# Patient Record
Sex: MALE | Race: BLACK OR AFRICAN AMERICAN | Employment: UNEMPLOYED | URBAN - METROPOLITAN AREA
[De-identification: names, ages, dates, MRNs, and addresses within clinical notes are randomized per-mention and may not be internally consistent; named-entity substitution may affect disease eponyms.]

---

## 2019-01-01 ENCOUNTER — ANESTHESIA EVENT (OUTPATIENT)
Dept: SURGERY | Age: 64
End: 2019-01-01
Payer: MEDICAID

## 2019-01-01 ENCOUNTER — HOSPITAL ENCOUNTER (OUTPATIENT)
Dept: NUCLEAR MEDICINE | Age: 64
Discharge: HOME | End: 2019-11-13
Attending: UROLOGY
Payer: MEDICAID

## 2019-01-01 ENCOUNTER — HOSPITAL ENCOUNTER (OUTPATIENT)
Age: 64
Setting detail: OUTPATIENT SURGERY
Discharge: HOME | End: 2019-09-19
Attending: UROLOGY | Admitting: UROLOGY
Payer: MEDICAID

## 2019-01-01 ENCOUNTER — APPOINTMENT (OUTPATIENT)
Dept: GENERAL RADIOLOGY | Age: 64
End: 2019-01-01
Attending: UROLOGY
Payer: MEDICAID

## 2019-01-01 ENCOUNTER — HOSPITAL ENCOUNTER (OUTPATIENT)
Dept: RADIATION THERAPY | Age: 64
Discharge: HOME | End: 2019-11-05

## 2019-01-01 ENCOUNTER — ANESTHESIA (OUTPATIENT)
Dept: SURGERY | Age: 64
End: 2019-01-01
Payer: MEDICAID

## 2019-01-01 ENCOUNTER — HOSPITAL ENCOUNTER (OUTPATIENT)
Dept: PREADMISSION TESTING | Age: 64
Discharge: HOME | End: 2019-09-16
Payer: MEDICAID

## 2019-01-01 ENCOUNTER — HOSPITAL ENCOUNTER (OUTPATIENT)
Dept: LAB | Age: 64
Discharge: HOME | End: 2019-09-16
Payer: MEDICAID

## 2019-01-01 VITALS
BODY MASS INDEX: 15.81 KG/M2 | OXYGEN SATURATION: 100 % | HEART RATE: 60 BPM | WEIGHT: 98.38 LBS | RESPIRATION RATE: 16 BRPM | TEMPERATURE: 97 F | SYSTOLIC BLOOD PRESSURE: 169 MMHG | DIASTOLIC BLOOD PRESSURE: 98 MMHG | HEIGHT: 66 IN

## 2019-01-01 DIAGNOSIS — Z01.818 PREOP TESTING: ICD-10-CM

## 2019-01-01 DIAGNOSIS — C79.51 PROSTATE CANCER METASTATIC TO BONE (HCC): Primary | ICD-10-CM

## 2019-01-01 DIAGNOSIS — C61 PROSTATE CANCER METASTATIC TO BONE (HCC): Primary | ICD-10-CM

## 2019-01-01 DIAGNOSIS — C61 PROSTATE CANCER (HCC): ICD-10-CM

## 2019-01-01 LAB
ANION GAP SERPL CALC-SCNC: 11 MMOL/L (ref 3–18)
ATRIAL RATE: 84 BPM
BACTERIA SPEC CULT: ABNORMAL
BUN SERPL-MCNC: 14 MG/DL (ref 7–18)
BUN/CREAT SERPL: 23 (ref 12–20)
CALCIUM SERPL-MCNC: 10.2 MG/DL (ref 8.5–10.1)
CALCULATED P AXIS, ECG09: 83 DEGREES
CALCULATED R AXIS, ECG10: -50 DEGREES
CALCULATED T AXIS, ECG11: -29 DEGREES
CHLORIDE SERPL-SCNC: 100 MMOL/L (ref 100–111)
CO2 SERPL-SCNC: 26 MMOL/L (ref 21–32)
CREAT SERPL-MCNC: 0.6 MG/DL (ref 0.6–1.3)
DIAGNOSIS, 93000: NORMAL
ERYTHROCYTE [DISTWIDTH] IN BLOOD BY AUTOMATED COUNT: 18.7 % (ref 11.6–14.5)
GLUCOSE SERPL-MCNC: 78 MG/DL (ref 74–99)
HCT VFR BLD AUTO: 31.7 % (ref 36–48)
HGB BLD-MCNC: 10.1 G/DL (ref 13–16)
MCH RBC QN AUTO: 26.6 PG (ref 24–34)
MCHC RBC AUTO-ENTMCNC: 31.9 G/DL (ref 31–37)
MCV RBC AUTO: 83.4 FL (ref 74–97)
P-R INTERVAL, ECG05: 158 MS
PLATELET # BLD AUTO: 300 K/UL (ref 135–420)
PMV BLD AUTO: 10.5 FL (ref 9.2–11.8)
POTASSIUM SERPL-SCNC: 4.6 MMOL/L (ref 3.5–5.5)
Q-T INTERVAL, ECG07: 386 MS
QRS DURATION, ECG06: 70 MS
QTC CALCULATION (BEZET), ECG08: 456 MS
RBC # BLD AUTO: 3.8 M/UL (ref 4.7–5.5)
SERVICE CMNT-IMP: ABNORMAL
SERVICE CMNT-IMP: ABNORMAL
SODIUM SERPL-SCNC: 137 MMOL/L (ref 136–145)
VENTRICULAR RATE, ECG03: 84 BPM
WBC # BLD AUTO: 6.6 K/UL (ref 4.6–13.2)

## 2019-01-01 PROCEDURE — C2625 STENT, NON-COR, TEM W/DEL SY: HCPCS | Performed by: UROLOGY

## 2019-01-01 PROCEDURE — 88305 TISSUE EXAM BY PATHOLOGIST: CPT

## 2019-01-01 PROCEDURE — 74011250636 HC RX REV CODE- 250/636: Performed by: NURSE ANESTHETIST, CERTIFIED REGISTERED

## 2019-01-01 PROCEDURE — 77030013604 HC ELECTRD CUT LP OCOA -F: Performed by: UROLOGY

## 2019-01-01 PROCEDURE — 77030008477 HC STYL SATN SLP COVD -A: Performed by: ANESTHESIOLOGY

## 2019-01-01 PROCEDURE — 77030034696 HC CATH URETH FOL 2W BARD -A: Performed by: UROLOGY

## 2019-01-01 PROCEDURE — 76010000160 HC OR TIME 0.5 TO 1 HR INTENSV-TIER 1: Performed by: UROLOGY

## 2019-01-01 PROCEDURE — 87077 CULTURE AEROBIC IDENTIFY: CPT

## 2019-01-01 PROCEDURE — 77030010545: Performed by: UROLOGY

## 2019-01-01 PROCEDURE — 77030013079 HC BLNKT BAIR HGGR 3M -A: Performed by: ANESTHESIOLOGY

## 2019-01-01 PROCEDURE — 74011250636 HC RX REV CODE- 250/636

## 2019-01-01 PROCEDURE — 77030008683 HC TU ET CUF COVD -A: Performed by: ANESTHESIOLOGY

## 2019-01-01 PROCEDURE — 74011000258 HC RX REV CODE- 258

## 2019-01-01 PROCEDURE — 77030025663: Performed by: UROLOGY

## 2019-01-01 PROCEDURE — 85027 COMPLETE CBC AUTOMATED: CPT

## 2019-01-01 PROCEDURE — 76210000026 HC REC RM PH II 1 TO 1.5 HR: Performed by: UROLOGY

## 2019-01-01 PROCEDURE — 74011250636 HC RX REV CODE- 250/636: Performed by: UROLOGY

## 2019-01-01 PROCEDURE — 87086 URINE CULTURE/COLONY COUNT: CPT

## 2019-01-01 PROCEDURE — 36415 COLL VENOUS BLD VENIPUNCTURE: CPT

## 2019-01-01 PROCEDURE — 74011636320 HC RX REV CODE- 636/320: Performed by: UROLOGY

## 2019-01-01 PROCEDURE — 74011000272 HC RX REV CODE- 272: Performed by: UROLOGY

## 2019-01-01 PROCEDURE — 76210000006 HC OR PH I REC 0.5 TO 1 HR: Performed by: UROLOGY

## 2019-01-01 PROCEDURE — 74011000258 HC RX REV CODE- 258: Performed by: UROLOGY

## 2019-01-01 PROCEDURE — 80048 BASIC METABOLIC PNL TOTAL CA: CPT

## 2019-01-01 PROCEDURE — 74011000250 HC RX REV CODE- 250

## 2019-01-01 PROCEDURE — 74420 UROGRAPHY RTRGR +-KUB: CPT

## 2019-01-01 PROCEDURE — 77030018846 HC SOL IRR STRL H20 ICUM -A: Performed by: UROLOGY

## 2019-01-01 PROCEDURE — 87186 SC STD MICRODIL/AGAR DIL: CPT

## 2019-01-01 PROCEDURE — 74011000250 HC RX REV CODE- 250: Performed by: UROLOGY

## 2019-01-01 PROCEDURE — 77030018836 HC SOL IRR NACL ICUM -A: Performed by: UROLOGY

## 2019-01-01 PROCEDURE — 93005 ELECTROCARDIOGRAM TRACING: CPT

## 2019-01-01 PROCEDURE — 77030012961 HC IRR KT CYSTO/TUR ICUM -A: Performed by: UROLOGY

## 2019-01-01 PROCEDURE — 77030020269 HC MISC IMPL: Performed by: UROLOGY

## 2019-01-01 PROCEDURE — C1769 GUIDE WIRE: HCPCS | Performed by: UROLOGY

## 2019-01-01 PROCEDURE — 77030040361 HC SLV COMPR DVT MDII -B: Performed by: UROLOGY

## 2019-01-01 PROCEDURE — 76060000032 HC ANESTHESIA 0.5 TO 1 HR: Performed by: UROLOGY

## 2019-01-01 PROCEDURE — C1758 CATHETER, URETERAL: HCPCS | Performed by: UROLOGY

## 2019-01-01 PROCEDURE — 74011250637 HC RX REV CODE- 250/637: Performed by: UROLOGY

## 2019-01-01 DEVICE — INLAY OPTIMA URETERAL STENT W/O GUIDEWIRE
Type: IMPLANTABLE DEVICE | Site: URETER | Status: FUNCTIONAL
Brand: BARD® INLAY OPTIMA® URETERAL STENT

## 2019-01-01 RX ORDER — SODIUM CHLORIDE 0.9 % (FLUSH) 0.9 %
5-40 SYRINGE (ML) INJECTION EVERY 8 HOURS
Status: DISCONTINUED | OUTPATIENT
Start: 2019-01-01 | End: 2019-01-01 | Stop reason: HOSPADM

## 2019-01-01 RX ORDER — ONDANSETRON 2 MG/ML
4 INJECTION INTRAMUSCULAR; INTRAVENOUS ONCE
Status: CANCELLED | OUTPATIENT
Start: 2019-01-01 | End: 2019-01-01

## 2019-01-01 RX ORDER — SUCCINYLCHOLINE CHLORIDE 100 MG/5ML
SYRINGE (ML) INTRAVENOUS AS NEEDED
Status: DISCONTINUED | OUTPATIENT
Start: 2019-01-01 | End: 2019-01-01 | Stop reason: HOSPADM

## 2019-01-01 RX ORDER — FAMOTIDINE 20 MG/1
20 TABLET, FILM COATED ORAL ONCE
Status: DISCONTINUED | OUTPATIENT
Start: 2019-01-01 | End: 2019-01-01

## 2019-01-01 RX ORDER — OXYCODONE AND ACETAMINOPHEN 7.5; 325 MG/1; MG/1
1 TABLET ORAL
Qty: 30 TAB | Refills: 0 | Status: SHIPPED | OUTPATIENT
Start: 2019-01-01 | End: 2019-01-01

## 2019-01-01 RX ORDER — HYDROMORPHONE HYDROCHLORIDE 2 MG/ML
0.5 INJECTION, SOLUTION INTRAMUSCULAR; INTRAVENOUS; SUBCUTANEOUS
Status: CANCELLED | OUTPATIENT
Start: 2019-01-01

## 2019-01-01 RX ORDER — SODIUM CHLORIDE, SODIUM LACTATE, POTASSIUM CHLORIDE, CALCIUM CHLORIDE 600; 310; 30; 20 MG/100ML; MG/100ML; MG/100ML; MG/100ML
50 INJECTION, SOLUTION INTRAVENOUS CONTINUOUS
Status: DISCONTINUED | OUTPATIENT
Start: 2019-01-01 | End: 2019-01-01 | Stop reason: HOSPADM

## 2019-01-01 RX ORDER — FENTANYL CITRATE 50 UG/ML
50 INJECTION, SOLUTION INTRAMUSCULAR; INTRAVENOUS AS NEEDED
Status: CANCELLED | OUTPATIENT
Start: 2019-01-01

## 2019-01-01 RX ORDER — FAMOTIDINE 20 MG/1
20 TABLET, FILM COATED ORAL ONCE
Status: COMPLETED | OUTPATIENT
Start: 2019-01-01 | End: 2019-01-01

## 2019-01-01 RX ORDER — MAGNESIUM SULFATE 100 %
4 CRYSTALS MISCELLANEOUS AS NEEDED
Status: DISCONTINUED | OUTPATIENT
Start: 2019-01-01 | End: 2019-01-01 | Stop reason: HOSPADM

## 2019-01-01 RX ORDER — FENTANYL CITRATE 50 UG/ML
INJECTION, SOLUTION INTRAMUSCULAR; INTRAVENOUS AS NEEDED
Status: DISCONTINUED | OUTPATIENT
Start: 2019-01-01 | End: 2019-01-01 | Stop reason: HOSPADM

## 2019-01-01 RX ORDER — LIDOCAINE HYDROCHLORIDE 20 MG/ML
INJECTION, SOLUTION EPIDURAL; INFILTRATION; INTRACAUDAL; PERINEURAL AS NEEDED
Status: DISCONTINUED | OUTPATIENT
Start: 2019-01-01 | End: 2019-01-01 | Stop reason: HOSPADM

## 2019-01-01 RX ORDER — DEXAMETHASONE SODIUM PHOSPHATE 4 MG/ML
INJECTION, SOLUTION INTRA-ARTICULAR; INTRALESIONAL; INTRAMUSCULAR; INTRAVENOUS; SOFT TISSUE AS NEEDED
Status: DISCONTINUED | OUTPATIENT
Start: 2019-01-01 | End: 2019-01-01 | Stop reason: HOSPADM

## 2019-01-01 RX ORDER — SODIUM CHLORIDE 0.9 % (FLUSH) 0.9 %
5-40 SYRINGE (ML) INJECTION AS NEEDED
Status: DISCONTINUED | OUTPATIENT
Start: 2019-01-01 | End: 2019-01-01 | Stop reason: HOSPADM

## 2019-01-01 RX ORDER — PROPOFOL 10 MG/ML
INJECTION, EMULSION INTRAVENOUS AS NEEDED
Status: DISCONTINUED | OUTPATIENT
Start: 2019-01-01 | End: 2019-01-01 | Stop reason: HOSPADM

## 2019-01-01 RX ORDER — OXYBUTYNIN CHLORIDE 5 MG/1
5 TABLET ORAL
Qty: 10 TAB | Refills: 0 | Status: SHIPPED | OUTPATIENT
Start: 2019-01-01 | End: 2020-01-01

## 2019-01-01 RX ORDER — MIDAZOLAM HYDROCHLORIDE 1 MG/ML
INJECTION, SOLUTION INTRAMUSCULAR; INTRAVENOUS AS NEEDED
Status: DISCONTINUED | OUTPATIENT
Start: 2019-01-01 | End: 2019-01-01 | Stop reason: HOSPADM

## 2019-01-01 RX ORDER — HYDROMORPHONE HYDROCHLORIDE 2 MG/ML
0.5 INJECTION, SOLUTION INTRAMUSCULAR; INTRAVENOUS; SUBCUTANEOUS
Status: DISCONTINUED | OUTPATIENT
Start: 2019-01-01 | End: 2019-01-01 | Stop reason: HOSPADM

## 2019-01-01 RX ORDER — ONDANSETRON HYDROCHLORIDE 4 MG/2ML
INJECTION, SOLUTION INTRAMUSCULAR; INTRAVENOUS AS NEEDED
Status: DISCONTINUED | OUTPATIENT
Start: 2019-01-01 | End: 2019-01-01 | Stop reason: HOSPADM

## 2019-01-01 RX ORDER — SODIUM CHLORIDE 0.9 % (FLUSH) 0.9 %
5-40 SYRINGE (ML) INJECTION EVERY 8 HOURS
Status: CANCELLED | OUTPATIENT
Start: 2019-01-01

## 2019-01-01 RX ORDER — MAGNESIUM SULFATE 100 %
4 CRYSTALS MISCELLANEOUS AS NEEDED
Status: CANCELLED | OUTPATIENT
Start: 2019-01-01

## 2019-01-01 RX ORDER — DIPHENHYDRAMINE HYDROCHLORIDE 50 MG/ML
INJECTION, SOLUTION INTRAMUSCULAR; INTRAVENOUS AS NEEDED
Status: DISCONTINUED | OUTPATIENT
Start: 2019-01-01 | End: 2019-01-01 | Stop reason: HOSPADM

## 2019-01-01 RX ORDER — FENTANYL CITRATE 50 UG/ML
50 INJECTION, SOLUTION INTRAMUSCULAR; INTRAVENOUS AS NEEDED
Status: DISCONTINUED | OUTPATIENT
Start: 2019-01-01 | End: 2019-01-01 | Stop reason: HOSPADM

## 2019-01-01 RX ORDER — SODIUM CHLORIDE 0.9 % (FLUSH) 0.9 %
5-40 SYRINGE (ML) INJECTION AS NEEDED
Status: CANCELLED | OUTPATIENT
Start: 2019-01-01

## 2019-01-01 RX ORDER — ONDANSETRON 2 MG/ML
4 INJECTION INTRAMUSCULAR; INTRAVENOUS ONCE
Status: DISCONTINUED | OUTPATIENT
Start: 2019-01-01 | End: 2019-01-01 | Stop reason: HOSPADM

## 2019-01-01 RX ORDER — DIPHENHYDRAMINE HYDROCHLORIDE 50 MG/ML
12.5 INJECTION, SOLUTION INTRAMUSCULAR; INTRAVENOUS ONCE
Status: DISCONTINUED | OUTPATIENT
Start: 2019-01-01 | End: 2019-01-01 | Stop reason: HOSPADM

## 2019-01-01 RX ADMIN — GENTAMICIN SULFATE 240 MG: 40 INJECTION, SOLUTION INTRAMUSCULAR; INTRAVENOUS at 11:21

## 2019-01-01 RX ADMIN — MIDAZOLAM HYDROCHLORIDE 1 MG: 1 INJECTION, SOLUTION INTRAMUSCULAR; INTRAVENOUS at 10:08

## 2019-01-01 RX ADMIN — SODIUM CHLORIDE, SODIUM LACTATE, POTASSIUM CHLORIDE, AND CALCIUM CHLORIDE 50 ML/HR: 600; 310; 30; 20 INJECTION, SOLUTION INTRAVENOUS at 08:42

## 2019-01-01 RX ADMIN — PROPOFOL 100 MG: 10 INJECTION, EMULSION INTRAVENOUS at 10:21

## 2019-01-01 RX ADMIN — FAMOTIDINE 20 MG: 20 TABLET ORAL at 08:44

## 2019-01-01 RX ADMIN — DEXAMETHASONE SODIUM PHOSPHATE 4 MG: 4 INJECTION, SOLUTION INTRA-ARTICULAR; INTRALESIONAL; INTRAMUSCULAR; INTRAVENOUS; SOFT TISSUE at 10:39

## 2019-01-01 RX ADMIN — PROPOFOL 50 MG: 10 INJECTION, EMULSION INTRAVENOUS at 10:30

## 2019-01-01 RX ADMIN — Medication 100 MG: at 10:21

## 2019-01-01 RX ADMIN — CEFTRIAXONE SODIUM 2 G: 2 INJECTION, POWDER, FOR SOLUTION INTRAMUSCULAR; INTRAVENOUS at 10:23

## 2019-01-01 RX ADMIN — FENTANYL CITRATE 50 MCG: 50 INJECTION, SOLUTION INTRAMUSCULAR; INTRAVENOUS at 10:21

## 2019-01-01 RX ADMIN — DIPHENHYDRAMINE HYDROCHLORIDE 25 MG: 50 INJECTION, SOLUTION INTRAMUSCULAR; INTRAVENOUS at 10:29

## 2019-01-01 RX ADMIN — FENTANYL CITRATE 25 MCG: 50 INJECTION, SOLUTION INTRAMUSCULAR; INTRAVENOUS at 10:31

## 2019-01-01 RX ADMIN — ONDANSETRON HYDROCHLORIDE 4 MG: 4 INJECTION, SOLUTION INTRAMUSCULAR; INTRAVENOUS at 10:52

## 2019-01-01 RX ADMIN — LIDOCAINE HYDROCHLORIDE 40 MG: 20 INJECTION, SOLUTION EPIDURAL; INFILTRATION; INTRACAUDAL; PERINEURAL at 10:21

## 2019-08-29 PROBLEM — F17.200 NICOTINE DEPENDENCE: Status: ACTIVE | Noted: 2019-01-01

## 2019-08-29 PROBLEM — K08.89 TOOTH PAIN: Status: ACTIVE | Noted: 2019-01-01

## 2019-08-29 PROBLEM — Z71.89 COUNSELING ON SUBSTANCE USE AND ABUSE: Status: ACTIVE | Noted: 2019-01-01

## 2019-08-29 PROBLEM — M65.4 DE QUERVAIN'S TENOSYNOVITIS: Status: ACTIVE | Noted: 2019-01-01

## 2019-08-29 PROBLEM — I10 ESSENTIAL HYPERTENSION: Status: ACTIVE | Noted: 2019-01-01

## 2019-08-29 PROBLEM — M25.539 PAIN IN WRIST JOINT: Status: ACTIVE | Noted: 2019-01-01

## 2019-08-29 PROBLEM — C61 PROSTATE CANCER METASTATIC TO BONE (HCC): Status: ACTIVE | Noted: 2019-01-01

## 2019-08-29 PROBLEM — N13.9 OBSTRUCTIVE UROPATHY: Status: ACTIVE | Noted: 2019-01-01

## 2019-08-29 PROBLEM — F32.A DEPRESSION: Status: ACTIVE | Noted: 2019-01-01

## 2019-08-29 PROBLEM — G47.00 INSOMNIA: Status: ACTIVE | Noted: 2019-01-01

## 2019-08-29 PROBLEM — R74.8 ELEVATED ALKALINE PHOSPHATASE LEVEL: Status: ACTIVE | Noted: 2019-01-01

## 2019-08-29 PROBLEM — R32 URINARY INCONTINENCE: Status: ACTIVE | Noted: 2019-01-01

## 2019-08-29 PROBLEM — Z23 IMMUNIZATION DUE: Status: ACTIVE | Noted: 2019-01-01

## 2019-08-29 PROBLEM — H53.8 BLURRY VISION: Status: ACTIVE | Noted: 2019-01-01

## 2019-08-29 PROBLEM — K05.30 PERIODONTITIS: Status: ACTIVE | Noted: 2019-01-01

## 2019-08-29 PROBLEM — D64.9 CHRONIC ANEMIA: Status: ACTIVE | Noted: 2019-01-01

## 2019-08-29 PROBLEM — Z20.2 EXPOSURE TO STD: Status: ACTIVE | Noted: 2019-01-01

## 2019-08-29 PROBLEM — N13.5 OBSTRUCTION OF RIGHT URETER: Status: ACTIVE | Noted: 2019-01-01

## 2019-08-29 PROBLEM — G57.01 PIRIFORMIS SYNDROME OF RIGHT SIDE: Status: ACTIVE | Noted: 2019-01-01

## 2019-08-29 PROBLEM — L29.9 PRURITUS: Status: ACTIVE | Noted: 2019-01-01

## 2019-08-29 PROBLEM — F41.9 ANXIETY AND DEPRESSION: Status: ACTIVE | Noted: 2019-01-01

## 2019-08-29 PROBLEM — Z87.891 HISTORY OF SMOKING 25-50 PACK YEARS: Status: ACTIVE | Noted: 2019-01-01

## 2019-08-29 PROBLEM — M25.579 ANKLE JOINT PAIN: Status: ACTIVE | Noted: 2019-01-01

## 2019-08-29 PROBLEM — N52.9 ERECTILE DYSFUNCTION: Status: ACTIVE | Noted: 2019-01-01

## 2019-08-29 PROBLEM — R19.5 HEME POSITIVE STOOL: Status: ACTIVE | Noted: 2019-01-01

## 2019-08-29 PROBLEM — N40.0 ENLARGED PROSTATE: Status: ACTIVE | Noted: 2019-01-01

## 2019-08-29 PROBLEM — F10.10 NONDEPENDENT ALCOHOL ABUSE, CONTINUOUS DRINKING BEHAVIOR: Status: ACTIVE | Noted: 2019-01-01

## 2019-08-29 PROBLEM — M25.551 RIGHT HIP PAIN: Status: ACTIVE | Noted: 2019-01-01

## 2019-08-29 PROBLEM — F32.A ANXIETY AND DEPRESSION: Status: ACTIVE | Noted: 2019-01-01

## 2019-08-29 PROBLEM — M79.673 FOOT PAIN: Status: ACTIVE | Noted: 2019-01-01

## 2019-08-29 PROBLEM — C79.51 PROSTATE CANCER METASTATIC TO BONE (HCC): Status: ACTIVE | Noted: 2019-01-01

## 2019-08-29 NOTE — H&P (VIEW-ONLY)
Charito Asp  1955 
63 y.o. 
2019 PROSTATE CANCER ESTABLISHED PATIENT Encounter Diagnoses ICD-10-CM ICD-9-CM 1. Prostate cancer ([facility] [address] C61 185 2. Hematuria, unspecified type R31.9 599.70 Assessment: 1. 66 y.o. male with [id number] GS 5+4 (Diagnosed 5/10/19), Prostate cancer in 4/4 cores, prostate volume of 34 cm3, Pre-biopsy PSA= 79.050 ng/mL. CT A/P W Cont 19 - Widespread bony metastatic disease. ADT initiated with Degarelix 240mg on 6/3/19. Transitioned to Eligard 45mg on 19. Started on [place] 2019 Most recent PSA measured 195 ng/mL on 19 
 
2. Obstructive uropathy 2/ prostatic mass w/moderate right hydroureteronephrosis per CT A/P W Cont on  Right PCNU tube placed 2019.  
 
3. Urinary retention, catheter placed 3/25/19. VT failed 5/10/2019. Vargas catheter removed 2019. 
 
4. Gross hematuria s/p right PCNU tube placement on 19 S/p right renal angiogram on 19 without evidence of bleeding or vascular abnormality 5. Vit D deficiency, currently on ergocalciferol 6. HTN Plan: · PSA, T drawn today, will inform · Urine sent for pre-op culture, will treat accordingly · Continue ADT with Eligard 45mg, most recent injection on 19 · Continue Vit D and Ca supplements to support bone health during ADT. Continue ergocalciferol · Continue [place] · Dental clearance cost prohibitive, unable to start [facility] · Referred to pain management at [facility] to discuss chronic pain management. Refill for percocet provided today but advised that all future RXs will need to come from pain management · Plan for PCNU tube removal, right ureteral stent placement, and a possible channel TURP. Reviewed R/B. Surgery letter sent · Explained ureteral stent will need to be exchanged q6 months · To OR for PCNU tube removal, right ureteral stent placement, and a possible channel TURP 
 
 *Please call [phone] Sean Reynolds, patient's cousin) to schedule surgery* Chief Complaint:  
Chief Complaint Patient presents with  Prostate Cancer  Urinary Retention HPI: Vin Brooks is a 66 y.o. male who presents in follow up for recently diagnosed prostate cancer and urinary retention. Patient is s/p prostate biopsy on 5/10/19 for elevated PSA of 79.050 ng/mL, which revealed GS 5+4, prostate cancer in 4/4 cores, TRUS volume of 34 cm3. Patient is also followed for urinary retention after a zelaya catheter was placed on 3/25/19 in the ER. Patient had a VT at the time of the biopsy that was unsuccessful. Zelaya removed 6/2019. Patient had a CT A/P on 5/31/19 that revealed diffuse bony metastatic disease as well as obstructing uropathy and associated hydroureteronephrosis on the right 2/2 a prostatic mass. A bone scan was not performed. Patient is s/p a right PCNU tube placement on 6/2/19. Since the PCNU tube was placed, patient was c/o gross hematuria. He then had a right renal angiogram on 6/7/19 to further evaluate that was negative for any active bleeding. Patient no longer has a zelaya catheter. Was started on ADT in the hospital with Degarelix 240mg on 6/3/2019. Transitioned to Eligard 45mg injection on 7/18/19. Patient presents today with his cousin. He notes ongoing trouble managing his pain which he primarily localizes around his PCNU tube. He has been taking percocet with only temporary relief. He remains unable to void. His cousin also indicated that the patient tried to get a dental clearance because it was cost prohibitive to see the dentist. She also notes patient has been difficulty walking 2/2 his pain. AUA SS N/A 
MEGAN 3 on 5/23/2019 PCa Dx DATE: 5/10/19 PSA Trend (ng/ml): PSA /TESTOSTERONE - BSHSI PSA Latest Ref Rng & Units 0.00 - 4.100 ng/ml 7/18/2019 195  
2/27/2019 79.050 (A) Past Medical History Past Medical History: Diagnosis Date  Elevated PSA  Hypertension  Prostate cancer metastatic to bone ([facility] [address]  Psychiatric disorder  Sleep disorder Past Surgical History Past Surgical History:  
Procedure Laterality Date  HX UROLOGICAL  05/10/2019 Pnbx-Trus vol 34 cm3. Path: [place] 5+4 in L Weaver, [place] 4+5 in L Base, R Base, R Weaver. Dr. Amada Agustin. Family History History reviewed. No pertinent family history. Social History Social History Socioeconomic History  Marital status: SINGLE Spouse name: Not on file  Number of children: Not on file  Years of education: Not on file  Highest education level: Not on file Occupational History  Not on file Social Needs  Financial resource strain: Not on file  Food insecurity:  
  Worry: Not on file Inability: Not on file  Transportation needs:  
  Medical: Not on file Non-medical: Not on file Tobacco Use  Smoking status: Current Every Day Smoker Packs/day: 1.00 Years: 45.00 Pack years: 45.00  Smokeless tobacco: Former User Substance and Sexual Activity  Alcohol use: Yes  Drug use: Not on file  Sexual activity: Not on file Lifestyle  Physical activity:  
  Days per week: Not on file Minutes per session: Not on file  Stress: Not on file Relationships  Social connections:  
  Talks on phone: Not on file Gets together: Not on file Attends Anabaptist service: Not on file Active member of club or organization: Not on file Attends meetings of clubs or organizations: Not on file Relationship status: Not on file  Intimate partner violence:  
  Fear of current or ex partner: Not on file Emotionally abused: Not on file Physically abused: Not on file Forced sexual activity: Not on file Other Topics Concern  Not on file Social History Narrative  Not on file Allergies Allergen Reactions  Pcn [Penicillins] Hives Current Outpatient Medications Medication Sig  
 oxyCODONE-acetaminophen (PERCOCET) 5-325 mg per tablet Take 1 Tab by mouth every four (4) hours as needed for Pain for up to 30 days. Max Daily Amount: 6 Tabs.  enzalutamide (XTANDI) 40 mg capsule Take four capsules by mouth daily at bedtime  ergocalciferol (ERGOCALCIFEROL) 50,000 unit capsule Take 50,000 Units by mouth.  VITAMIN B-1 100 mg tablet TAKE 1 TABLET BY MOUTH ONCE DAILY  thiamine HCL (B-1) 100 mg tablet Take 100 mg by mouth.  senna-docusate (PERICOLACE) 8.6-50 mg per tablet Take 1 Tab by mouth.  lidocaine 4 % patch 1 Patch by TransDERmal route.  naproxen (NAPROSYN) 500 mg tablet Take 500 mg by mouth.  mirtazapine (REMERON) 15 mg tablet  amLODIPine (NORVASC) 5 mg tablet Take  by mouth.  tamsulosin (FLOMAX) 0.4 mg capsule Take  by mouth.  hydroCHLOROthiazide (HYDRODIURIL) 25 mg tablet Take  by mouth. No current facility-administered medications for this visit. Review of Systems Constitutional: Fever: No 
Skin: Rash: No 
HEENT: Hearing difficulty: No 
Eyes: Blurred vision: No 
Cardiovascular: Chest pain: No 
Respiratory: Shortness of breath: No 
Gastrointestinal: Nausea/vomiting: No 
Musculoskeletal: Back pain: Yes 
back pain Neurological: Weakness: No 
Psychological: Memory loss: No 
Comments/additional findings:  
 
 
Physical Exam: 
Visit Vitals Ht 5' 6\" (1.676 m) Wt 99 lb 6.4 oz (45.1 kg) BMI 16.04 kg/m² Constitutional: WDWN,No acute distress. HEENT: EOMs in tact CV:  No peripheral swelling noted Respiratory: No respiratory distress or difficulties : Right PCNU to leg bag, urine very dark and cloudy Skin: No jaundice. Neuro/Psych:  Alert and oriented x 3. Affect appropriate. EXT: no clubbing or cyanosis Labs reviewed today:  
Results for orders placed or performed in visit on 07/18/19 TESTOSTERONE, TOTAL, ADULT MALE Result Value Ref Range Testosterone 15 (L) 264 - 916 ng/dL PSA, DIAGNOSTIC (PROSTATE SPECIFIC AG) Result Value Ref Range Prostate Specific Ag 195.0 (H) 0.0 - 4.0 ng/mL AMB POC PVR, JEAN PIERRE,POST-VOID RES,US,NON-IMAGING Result Value Ref Range PVR 0 cc  
 
 
 
Sandra Hodge MD 
, Dept of Urology [facility] [place], [place] Pager #: [phone] CC: Syed Arroyo MD 
 
Medical documentation entered into the chart by Carol Adams medical scribe for Jerod Peguero MD on 8/29/2019.

## 2019-09-16 NOTE — PERIOP NOTES
PAT - SURGICAL PRE-ADMISSION INSTRUCTIONS    NAME:  Esthela Kemp                                                          TODAY'S DATE:  9/16/2019    SURGERY DATE:  9/19/2019                                  SURGERY ARRIVAL TIME:   0800    1. Do NOT eat or drink anything, including candy or gum, after MIDNIGHT on 09/18 , unless you have specific instructions from your Surgeon or Anesthesia Provider to do so. 2. No smoking on the day of surgery. 3. No alcohol 24 hours prior to the day of surgery. 4. No recreational drugs for one week prior to the day of surgery. 5. Leave all valuables, including money/purse, at home. 6. Remove all jewelry, nail polish, makeup (including mascara); no lotions, powders, deodorant, or perfume/cologne/after shave. 7. Glasses/Contact lenses and Dentures may be worn to the hospital.  They will be removed prior to surgery. 8. Call your doctor if symptoms of a cold or illness develop within 24 ours prior to surgery. 9. AN ADULT MUST DRIVE YOU HOME AFTER OUTPATIENT SURGERY. 10. If you are having an OUTPATIENT procedure, please make arrangements for a responsible adult to be with you for 24 hours after your surgery. 11. If you are admitted to the hospital, you will be assigned to a bed after surgery is complete. Normally a family member will not be able to see you until you are in your assigned bed. 15. Family is encouraged to accompany you to the hospital.  We ask visitors in the treatment area to be limited to ONE person at a time to ensure patient privacy. EXCEPTIONS WILL BE MADE AS NEEDED. 15. Children under 12 are discouraged from entering the treatment area and need to be supervised by an adult when in the waiting room. Special Instructions:    NONE. Patient Prep:    use skin prep cloths with CHG    These surgical instructions were reviewed with Anne-Marie Arrington during the PAT visit. A printed copy of the instructions was provided to Anne-Marie Arrington. Directions:   On the morning of surgery, please go to the [facility]. Enter the building from the [facility] entrance, 1st floor (next to the Emergency Room entrance). Take the elevator to the 2nd floor. Sign in at the Registration Desk.     If you have any questions and/or concerns, please do not hesitate to call:  (Prior to the day of surgery)  [facility] unit:  [phone]  (Day of surgery)  [facility] unit:  [phone]

## 2019-09-16 NOTE — PERIOP NOTES
Pt.has a PCN catheter right side. Needs urine sample. Stopcock turned off to patient. When turned after 10-15 minutes to obtain specimen stopcock leaking . Crack noted on side lumen. Disconnected stopcock and acquried specimen and replaced with new stopcock. Tubing gently flushed with normal saline. New guaze dressing and tape to PCN site. Told patient to call home health nurse to place proper dressing.

## 2019-09-19 NOTE — PERIOP NOTES
Pre-Op Summary    Pt arrived via car with family/friend and is oriented to time, place, person and situation. Patient with unsteady gait with cane assistive devices. Visit Vitals  BP (!) 166/94 (BP 1 Location: Left arm)   Pulse 81   Temp 97.7 °F (36.5 °C)   Resp 18   Ht 5' 6\" (1.676 m)   Wt 44.6 kg (98 lb 6 oz)   SpO2 100%   BMI 15.88 kg/m²       Peripheral IV located on Right hand . Patients belongings are located locked in store room. Patient's point of contact is Yee Johns, cousin and sister Mohit Foster and their contact number is: [phone] [facility], They will be in the waiting room. They are able to receive medication information. They will be their ride home.

## 2019-09-19 NOTE — ANESTHESIA PREPROCEDURE EVALUATION
Relevant Problems No relevant active problems Anesthetic History No history of anesthetic complications Review of Systems / Medical History Patient summary reviewed, nursing notes reviewed and pertinent labs reviewed Pulmonary Within defined limits Neuro/Psych Psychiatric history Cardiovascular Hypertension GI/Hepatic/Renal 
Within defined limits Endo/Other Within defined limits Other Findings Physical Exam 
 
Airway Mallampati: III 
TM Distance: 4 - 6 cm Neck ROM: normal range of motion Mouth opening: Normal 
 
 Cardiovascular Regular rate and rhythm,  S1 and S2 normal,  no murmur, click, rub, or gallop Dental 
 
Dentition: Poor dentition Pulmonary Breath sounds clear to auscultation Abdominal 
GI exam deferred Other Findings Anesthetic Plan ASA: 2 Anesthesia type: general 
 
 
 
 
Induction: Intravenous Anesthetic plan and risks discussed with: Patient

## 2019-09-19 NOTE — BRIEF OP NOTE
BRIEF OPERATIVE NOTE    Date of Procedure: 9/19/2019   Preoperative Diagnosis: prostate cancer c61  Postoperative Diagnosis: prostate cancer c61    Procedure(s):  CYSTOSCOPY TRANSURETHRAL RESECTION OF PROSTATE, Right ureteral stent placement; Right retrograde pyelogram; Right nephrostomy tube drain removal  Surgeon(s) and Role:     Aron Mcrae MD - Primary         Surgical Assistant: none    Surgical Staff:  Circ-1: Ashley Ayala RN  Circ-2: Lynnette Blanc RN  Radiology Technician: Lit Ledezma  Scrub Tech-1: Jacques MELENDEZ  Event Time In Time Out   Incision Start 10:32 AM    Incision Close 11:03 AM      Anesthesia: General   Estimated Blood Loss: minimal  Specimens:   ID Type Source Tests Collected by Time Destination   1 : prostate chips Preservative Prostate  Dexter Ruiz MD 9/19/2019 10:44 AM Pathology   1 : bladder urine culture Urine Straight Cath CULTURE, URINE Dexter Ruiz MD 9/19/2019 10:32 AM Microbiology      Findings: mild right hydro with some delayed drainage. No prostate cancer seen invading bladder. Mild lateral lobar hyperplasia of the prostate. Complications: none  Implants:   Implant Name Type Inv.  Item Serial No.  Lot No. LRB No. Used Action   Inlay optima ureteral stent   N/A BARD [id number] Right 1 Implanted       Dano Cunha MD  , Dept of Urology  [facility]  [facility]  Pager #: [phone]

## 2019-09-19 NOTE — ANESTHESIA POSTPROCEDURE EVALUATION
Procedure(s): 
CYSTOSCOPY TRANSURETHRAL RESECTION OF PROSTATE, Right ureteral stent placement; Right retrograde pyelogram; Right nephrostomy tube drain removal. 
 
general 
 
Anesthesia Post Evaluation Multimodal analgesia: multimodal analgesia used between 6 hours prior to anesthesia start to PACU discharge Patient location during evaluation: bedside Patient participation: complete - patient participated Level of consciousness: awake Pain management: adequate Airway patency: patent Anesthetic complications: no 
Cardiovascular status: stable Respiratory status: acceptable Hydration status: acceptable Post anesthesia nausea and vomiting:  controlled Vitals Value Taken Time /76 9/19/2019 11:41 AM  
Temp 36.1 °C (97 °F) 9/19/2019 11:11 AM  
Pulse 66 9/19/2019 11:42 AM  
Resp 15 9/19/2019 11:42 AM  
SpO2 95 % 9/19/2019 11:43 AM  
Vitals shown include unvalidated device data.

## 2019-09-19 NOTE — INTERVAL H&P NOTE
H&P Update:  Vin Brooks was seen and examined. History and physical has been reviewed. The patient has been examined. There have been no significant clinical changes since the completion of the originally dated History and Physical.  Patient identified by surgeon; surgical site was confirmed by patient and surgeon.

## 2019-09-19 NOTE — DISCHARGE INSTRUCTIONS
Patient Education   Patient Education     DISCHARGE SUMMARY from Nurse    PATIENT INSTRUCTIONS:    After general anesthesia or intravenous sedation, for 24 hours or while taking prescription Narcotics:  · Limit your activities  · Do not drive and operate hazardous machinery  · Do not make important personal or business decisions  · Do  not drink alcoholic beverages  · If you have not urinated within 8 hours after discharge, please contact your surgeon on call. Report the following to your surgeon:  · Excessive pain, swelling, redness or odor of or around the surgical area  · Temperature over 100.5  · Nausea and vomiting lasting longer than 4 hours or if unable to take medications  · Any signs of decreased circulation or nerve impairment to extremity: change in color, persistent  numbness, tingling, coldness or increase pain  · Any questions    What to do at Home:  Recommended activity: Activity as tolerated and no driving for today,       No smoking/ No tobacco products/ Avoid exposure to second hand smoke  Surgeon General's Warning:  Quitting smoking now greatly reduces serious risk to your health. Obesity, smoking, and sedentary lifestyle greatly increases your risk for illness    A healthy diet, regular physical exercise & weight monitoring are important for maintaining a healthy lifestyle    You may be retaining fluid if you have a history of heart failure or if you experience any of the following symptoms:  Weight gain of 3 pounds or more overnight or 5 pounds in a week, increased swelling in our hands or feet or shortness of breath while lying flat in bed. Please call your doctor as soon as you notice any of these symptoms; do not wait until your next office visit. The discharge information has been reviewed with the patient. The patient verbalized understanding.   Discharge medications reviewed with the patient and appropriate educational materials and side effects teaching were provided. .Patient armband removed and shredded  ___________________________________________________________________________________________________________________________________     Ureteral Stent Placement: What to Expect at Home  Your Recovery    A ureteral (say \"you-REE-ter-ul\") stent is a thin, hollow tube that is placed in the ureter to help urine pass from the kidney into the bladder. Ureters are the tubes that connect the kidneys to the bladder. You may have a small amount of blood in your urine for 1 to 3 days after the procedure. While the stent is in place, you may have to urinate more often, feel a sudden need to urinate, or feel like you cannot completely empty your bladder. You may feel some pain when you urinate or do strenuous activity. You also may notice a small amount of blood in your urine after strenuous activities. These side effects usually do not prevent people from doing their normal daily activities. You may have a thin string coming out of your urethra. Your urethra is the tube that carries urine from your bladder to outside your body. This string is attached to the stent. Try not to pull on the string. The doctor will use the string to pull out the stent when you no longer need it. After the procedure, urine may flow better from your kidneys to your bladder. A ureteral stent may be left in place for several days or for as long as several months. Your doctor will take it out when you no longer need it. This care sheet gives you a general idea about how long it will take for you to recover. But each person recovers at a different pace. Follow the steps below to get better as quickly as possible. How can you care for yourself at home? Activity    · Rest when you feel tired.  Getting enough sleep will help you recover.     · Avoid strenuous activities, such as bicycle riding, jogging, weight lifting, or aerobic exercise, until your doctor says it is okay.     · Ask your doctor when you can drive again.     · Most people are able to return to work the day after the procedure. If your work requires intense activity, you may feel pain in your kidney area or get tired easily. If this happens, you may need to do less strenuous activities while the stent is in.     · Ask your doctor when it is okay for you to have sex. Diet    · You can eat your normal diet. If your stomach is upset, try bland, low-fat foods like plain rice, broiled chicken, toast, and yogurt.     · Drink plenty of fluids (unless your doctor tells you not to). Medicines    · Your doctor will tell you if and when you can restart your medicines. He or she will also give you instructions about taking any new medicines.     · If you take blood thinners, such as warfarin (Coumadin), clopidogrel (Plavix), or aspirin, be sure to talk to your doctor. He or she will tell you if and when to start taking those medicines again. Make sure that you understand exactly what your doctor wants you to do.     · Be safe with medicines. Take pain medicines exactly as directed. ? If the doctor gave you a prescription medicine for pain, take it as prescribed. ? If you are not taking a prescription pain medicine, ask your doctor if you can take an over-the-counter medicine.     · If you think your pain medicine is making you sick to your stomach:  ? Take your medicine after meals (unless your doctor has told you not to). ? Ask your doctor for a different pain medicine.     · If your doctor prescribed antibiotics, take them as directed. Do not stop taking them just because you feel better. You need to take the full course of antibiotics. Follow-up care is a key part of your treatment and safety. Be sure to make and go to all appointments, and call your doctor if you are having problems. It's also a good idea to know your test results and keep a list of the medicines you take. When should you call for help?   Call 911 anytime you think you may need emergency care. For example, call if:    · You passed out (lost consciousness).     · You have severe trouble breathing.     · You have sudden chest pain and shortness of breath, or you cough up blood.     · You have severe belly pain.    Call your doctor now or seek immediate medical care if:    · Part or all of the stent comes out of your urethra.     · You have pain that does not get better after you take pain medicine.     · You have symptoms of a urinary infection. For example:  ? You have blood or pus in your urine. ? You have pain in your back just below your rib cage. This is called flank pain. ? You have a fever, chills, or body aches. ? It hurts to urinate. ? You have groin or belly pain.     · You cannot control when you urinate, or you leak urine.    Watch closely for changes in your health, and be sure to contact your doctor if you have any problems. Where can you learn more? Go to http://jesus-ashli.info/. Enter L157 in the search box to learn more about \"Ureteral Stent Placement: What to Expect at Home. \"  Current as of: December 19, 2018  Content Version: 12.1  © 5858-5122 [employer]. Care instructions adapted under license by [employer] (which disclaims liability or warranty for this information). If you have questions about a medical condition or this instruction, always ask your healthcare professional. [place] [address] any warranty or liability for your use of this information. Cystoscopy: What to Expect at [facility]    A cystoscopy is a procedure that lets a doctor look inside of the bladder and the urethra. The urethra is the tube that carries urine from the bladder to outside the body. The doctor uses a thin, lighted tool called a cystoscope. Your bladder is filled with fluid. This stretches the bladder so that your doctor can look closely at the inside of your bladder.   After the cystoscopy, your urethra may be sore at first, and it may burn when you urinate for the first few days after the procedure. You may feel the need to urinate more often, and your urine may be pink. These symptoms should get better in 1 or 2 days. You will probably be able to go back to most of your usual activities in 1 or 2 days. This care sheet gives you a general idea about how long it will take for you to recover. But each person recovers at a different pace. Follow the steps below to get better as quickly as possible. How can you care for yourself at home? Activity    · Rest when you feel tired. Getting enough sleep will help you recover.     · Try to walk each day. Start by walking a little more than you did the day before. Bit by bit, increase the amount you walk. Walking boosts blood flow and helps prevent pneumonia and constipation.     · Avoid strenuous activities, such as bicycle riding, jogging, weight lifting, or aerobic exercise, until your doctor says it is okay.     · Ask your doctor when you can drive again.     · Most people are able to return to work within 1 or 2 days after the procedure.     · You may shower and take baths as usual.     · Ask your doctor when it is okay for you to have sex. Diet    · You can eat your normal diet. If your stomach is upset, try bland, low-fat foods like plain rice, broiled chicken, toast, and yogurt.     · Drink plenty of fluids (unless your doctor tells you not to). Medicines    · Take pain medicines exactly as directed. ? If the doctor gave you a prescription medicine for pain, take it as prescribed. ? If you are not taking a prescription pain medicine, ask your doctor if you can take an over-the-counter medicine.     · If you think your pain medicine is making you sick to your stomach:  ? Take your medicine after meals (unless your doctor has told you not to). ?  Ask your doctor for a different pain medicine.     · If your doctor prescribed antibiotics, take them as directed. Do not stop taking them just because you feel better. You need to take the full course of antibiotics. Follow-up care is a key part of your treatment and safety. Be sure to make and go to all appointments, and call your doctor if you are having problems. It's also a good idea to know your test results and keep a list of the medicines you take. When should you call for help? Call 911 anytime you think you may need emergency care. For example, call if:    · You passed out (lost consciousness).     · You have severe trouble breathing.     · You have sudden chest pain and shortness of breath, or you cough up blood.     · You have severe belly pain.    Call your doctor now or seek immediate medical care if:    · You are sick to your stomach or cannot keep fluids down.     · Your urine is still red or you see blood clots after you have urinated several times.     · You have trouble passing urine or stool, especially if you have pain or swelling in your lower belly.     · You have signs of a blood clot, such as:  ? Pain in your calf, back of the knee, thigh, or groin. ? Redness and swelling in your leg or groin.     · You develop a fever or severe chills.     · You have pain in your back just below your rib cage. This is called flank pain.    Watch closely for changes in your health, and be sure to contact your doctor if:    · You have pain or burning when you urinate. A burning feeling is normal for a day or two after the test, but call if it does not get better.     · You have a frequent urge to urinate but can pass only small amounts of urine.     · Your urine is pink, red, or cloudy, or smells bad. It is normal for the urine to have a pinkish color for a few days after the test, but call if it does not get better. Where can you learn more? Go to http://jesus-ashli.info/. Enter X006 in the search box to learn more about \"Cystoscopy: What to Expect at Home. \"  Current as of: December 19, 2018  Content Version: 12.1  © 4046-3052 [employer]. Care instructions adapted under license by [employer] (which disclaims liability or warranty for this information). If you have questions about a medical condition or this instruction, always ask your healthcare professional. [address] any warranty or liability for your use of this information.

## 2019-09-20 NOTE — OP NOTES
[facility] DIVISION 
OPERATIVE REPORT Name:  Lili Pace 
MR#:   [id number] :  1955 ACCOUNT #:  [de-identified] DATE OF SERVICE:  2019 PREOPERATIVE DIAGNOSES: 
1. Metastatic prostate cancer. 2.  Right hydronephrosis. 3.  Urinary retention. POSTOPERATIVE DIAGNOSES: 
1. Metastatic prostate cancer. 2.  Right hydronephrosis. 3.  Urinary retention. PROCEDURE PERFORMED: 
1. Cystoscopy. 2.  Transurethral resection of prostate. 3.  Right retrograde pyelogram. 
4.  Right ureteral stent placement. 5.  Right percutaneous nephroureteral stent removal. 
6.  Fluoroscopy time less than 30 minutes with interpretation. SURGEON:  Alvaro Rodas MD 
 
ASSISTANT:  None. ANESTHESIA:  General. 
 
COMPLICATIONS:  None. SPECIMENS REMOVED: 
1.  Bladder for urine culture and sensitivity. 2.  Prostate chips in formalin. DRAINS:  An 18-Amharic Vargas catheter. IMPLANTS/GRAFTS:  Right 24 cm x 7-Amharic double J ureteral stent. CONDITION:  Stable. ESTIMATED BLOOD LOSS:  Minimal. 
 
IV FLUIDS:  Crystalloid per anesthesia records. FINDINGS:  He had some mild right hydronephrosis on retrograde with some delayed drainage, but we were able to easily place a stent and remove his nephroureteral double J stent. He had some mild lateral lobar hyperplasia, but no gross cancer invading the bladder. INDICATION FOR THE PROCEDURE:  The patient is a 62-year-old -American male. He was diagnosed with metastatic prostate cancer to the bone. 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 He had a right percutaneous nephroureteral stent placed in 2018. He has failed multiple voiding trials and had a chronic Vargas catheter essentially since diagnosis. He was started on androgen deprivation therapy earlier this year.   We also treated him with upfront Xtandi based on the [facility] data. I recommended doing a channel TURP and to see if we could do a right ureteral stent and remove the nephroureteral stent. Risks, benefits, and alternatives were fully discussed with the patient. He agreed and consented to proceed. His preoperative urine culture was mixed. PROCEDURE:  The patient was taken to the operating room. Preoperative time-out was performed, identifying the correct patient and procedure to be done. He was prepped and draped in a sterile fashion, placed in a dorsal lithotomy position. He received a dose of IV Rocephin as well as IV gentamicin prior to the procedure start time. He had SCD cycling prior to induction of anesthesia. A 21-Bulgarian rigid cystoscope was passed into the urethra. The urethra was normal.  The prostate had some lateral lobar hyperplasia. There was no median lobe. There was no prostate cancer invading into the bladder. The right ureteral orifice was wide open. You could see the nephroureteral stent coming out of this. We had already clamped off the nephroureteral stent on the kidney side. The bladder was examined. No tumors were seen. There was mild trabeculation of the bladder. I could not identify the left ureteral orifice. We then performed a TURP. We removed as much prostate tissue as I could from the base of the prostate to the apex. We did not resect pass the verumontanum. We used cautery for hemostasis. There was minimal bleeding throughout the resection. Once I had performed the TURP, we irrigated out the chips, sent these for pathology specimen. We also sent a bladder urine for culture and sensitivity at the beginning of the case. At this point, our assistant cut the nephroureteral stent on the backside. We then were able to fold the stent through and through out the urethral meatus.   I was easily able to advance the Sensor wire through the stent to the right kidney. A right retrograde pyelogram was performed. This showed some narrowing of the proximal ureter, some mild-to-moderate hydronephrosis of the right side. We took some static fluoroscopic images. There was delayed drainage of the right kidney. I elected to place the stent. We then advanced the wire back into the kidney and a 7-Maltese x 24 cm double J stent was passed over the wire. Once the distal aspect of the stent was at the bladder neck, the wire was removed, the stent was deployed. Final fluoroscopic imaging confirmed good positioning of the stent with the proximal curl in the renal pelvis and distal curl seen in the bladder. We then removed the cystoscope. There was some oozing at the bladder neck. I cauterized this for hemostasis. We then placed an 18-Maltese Vargas into the bladder. The balloon was filled with 10 mL of sterile water and connected to gravity drainage. The urine was fairly clear at the end of the procedure. I placed sterile 4 x 4s and a couple OpSite dressings over the nephrostomy site. At this point, the procedure was completed. The patient tolerated the procedure well. There were no immediate complications. He was transferred to the recovery room in stable condition. PLAN:  Follow up on Monday for a voiding trial and to follow up with me in 2 weeks to see how he is voiding and check a postvoid residual. 
 
 
 
MD URSULA Rey [email or web address] 
D:  09/19/2019 11:53 
T:  09/19/2019 14:01 
JOB #:  [id number]

## 2020-01-01 ENCOUNTER — APPOINTMENT (OUTPATIENT)
Dept: GENERAL RADIOLOGY | Age: 65
End: 2020-01-01
Attending: UROLOGY
Payer: MEDICAID

## 2020-01-01 ENCOUNTER — HOME CARE VISIT (OUTPATIENT)
Dept: HOME HEALTH SERVICES | Facility: HOME HEALTH | Age: 65
End: 2020-01-01
Payer: MEDICAID

## 2020-01-01 ENCOUNTER — ANESTHESIA EVENT (OUTPATIENT)
Dept: SURGERY | Age: 65
End: 2020-01-01
Payer: MEDICAID

## 2020-01-01 ENCOUNTER — HOME HEALTH ADMISSION (OUTPATIENT)
Dept: HOME HEALTH SERVICES | Facility: HOME HEALTH | Age: 65
End: 2020-01-01
Payer: MEDICAID

## 2020-01-01 ENCOUNTER — HOSPITAL ENCOUNTER (OUTPATIENT)
Age: 65
Setting detail: OUTPATIENT SURGERY
Discharge: HOME | End: 2020-01-08
Attending: UROLOGY | Admitting: UROLOGY
Payer: MEDICAID

## 2020-01-01 ENCOUNTER — HOME CARE VISIT (OUTPATIENT)
Dept: SCHEDULING | Facility: HOME HEALTH | Age: 65
End: 2020-01-01
Payer: MEDICAID

## 2020-01-01 ENCOUNTER — HOSPICE ADMISSION (OUTPATIENT)
Dept: HOSPICE | Facility: HOSPICE | Age: 65
End: 2020-01-01

## 2020-01-01 ENCOUNTER — ANESTHESIA (OUTPATIENT)
Dept: SURGERY | Age: 65
End: 2020-01-01
Payer: MEDICAID

## 2020-01-01 ENCOUNTER — HOSPITAL ENCOUNTER (OUTPATIENT)
Dept: RADIATION THERAPY | Age: 65
Discharge: HOME | End: 2020-02-07
Payer: MEDICAID

## 2020-01-01 ENCOUNTER — HOSPITAL ENCOUNTER (OUTPATIENT)
Dept: RADIATION THERAPY | Age: 65
Discharge: HOME | End: 2020-02-10
Payer: MEDICAID

## 2020-01-01 ENCOUNTER — HOSPITAL ENCOUNTER (OUTPATIENT)
Dept: INFUSION THERAPY | Age: 65
Discharge: HOME | End: 2020-02-05
Payer: MEDICAID

## 2020-01-01 ENCOUNTER — HOSPITAL ENCOUNTER (OUTPATIENT)
Dept: LAB | Age: 65
Discharge: HOME | End: 2020-01-31
Payer: MEDICAID

## 2020-01-01 ENCOUNTER — HOSPITAL ENCOUNTER (OUTPATIENT)
Dept: INFUSION THERAPY | Age: 65
End: 2020-01-01

## 2020-01-01 ENCOUNTER — HOSPITAL ENCOUNTER (OUTPATIENT)
Dept: INFUSION THERAPY | Age: 65
Discharge: HOME | End: 2020-02-03
Payer: MEDICAID

## 2020-01-01 ENCOUNTER — HOSPITAL ENCOUNTER (OUTPATIENT)
Dept: RADIATION THERAPY | Age: 65
Discharge: HOME | End: 2020-02-11
Payer: MEDICAID

## 2020-01-01 ENCOUNTER — HOME CARE VISIT (OUTPATIENT)
Dept: HOSPICE | Facility: HOSPICE | Age: 65
End: 2020-01-01

## 2020-01-01 ENCOUNTER — APPOINTMENT (OUTPATIENT)
Dept: RADIATION THERAPY | Age: 65
End: 2020-01-01

## 2020-01-01 ENCOUNTER — HOSPITAL ENCOUNTER (OUTPATIENT)
Dept: RADIATION THERAPY | Age: 65
Discharge: HOME | End: 2020-01-31
Payer: MEDICAID

## 2020-01-01 ENCOUNTER — HOSPITAL ENCOUNTER (OUTPATIENT)
Dept: RADIATION THERAPY | Age: 65
Discharge: HOME | End: 2020-02-06
Payer: MEDICAID

## 2020-01-01 VITALS
OXYGEN SATURATION: 99 % | DIASTOLIC BLOOD PRESSURE: 58 MMHG | SYSTOLIC BLOOD PRESSURE: 120 MMHG | TEMPERATURE: 98.4 F | HEART RATE: 106 BPM

## 2020-01-01 VITALS
SYSTOLIC BLOOD PRESSURE: 122 MMHG | TEMPERATURE: 97.2 F | DIASTOLIC BLOOD PRESSURE: 80 MMHG | RESPIRATION RATE: 14 BRPM | OXYGEN SATURATION: 99 % | HEART RATE: 94 BPM

## 2020-01-01 VITALS
TEMPERATURE: 97 F | RESPIRATION RATE: 18 BRPM | DIASTOLIC BLOOD PRESSURE: 87 MMHG | HEART RATE: 89 BPM | OXYGEN SATURATION: 100 % | SYSTOLIC BLOOD PRESSURE: 162 MMHG

## 2020-01-01 VITALS
OXYGEN SATURATION: 96 % | HEART RATE: 86 BPM | SYSTOLIC BLOOD PRESSURE: 142 MMHG | TEMPERATURE: 98.6 F | DIASTOLIC BLOOD PRESSURE: 64 MMHG

## 2020-01-01 VITALS
SYSTOLIC BLOOD PRESSURE: 110 MMHG | TEMPERATURE: 98.4 F | HEART RATE: 86 BPM | DIASTOLIC BLOOD PRESSURE: 82 MMHG | OXYGEN SATURATION: 97 %

## 2020-01-01 VITALS
HEART RATE: 92 BPM | SYSTOLIC BLOOD PRESSURE: 152 MMHG | TEMPERATURE: 98 F | OXYGEN SATURATION: 98 % | DIASTOLIC BLOOD PRESSURE: 86 MMHG

## 2020-01-01 VITALS
HEART RATE: 100 BPM | TEMPERATURE: 97.8 F | DIASTOLIC BLOOD PRESSURE: 82 MMHG | TEMPERATURE: 99.2 F | OXYGEN SATURATION: 98 % | SYSTOLIC BLOOD PRESSURE: 140 MMHG | HEART RATE: 86 BPM | OXYGEN SATURATION: 97 % | SYSTOLIC BLOOD PRESSURE: 140 MMHG | DIASTOLIC BLOOD PRESSURE: 80 MMHG

## 2020-01-01 VITALS — DIASTOLIC BLOOD PRESSURE: 95 MMHG | OXYGEN SATURATION: 99 % | SYSTOLIC BLOOD PRESSURE: 160 MMHG | HEART RATE: 60 BPM

## 2020-01-01 VITALS
SYSTOLIC BLOOD PRESSURE: 126 MMHG | RESPIRATION RATE: 18 BRPM | OXYGEN SATURATION: 94 % | DIASTOLIC BLOOD PRESSURE: 78 MMHG | TEMPERATURE: 97.1 F | HEART RATE: 86 BPM

## 2020-01-01 VITALS
OXYGEN SATURATION: 98 % | TEMPERATURE: 98.1 F | HEART RATE: 89 BPM | SYSTOLIC BLOOD PRESSURE: 120 MMHG | DIASTOLIC BLOOD PRESSURE: 66 MMHG

## 2020-01-01 VITALS
HEART RATE: 96 BPM | OXYGEN SATURATION: 99 % | SYSTOLIC BLOOD PRESSURE: 134 MMHG | DIASTOLIC BLOOD PRESSURE: 84 MMHG | RESPIRATION RATE: 16 BRPM

## 2020-01-01 VITALS
TEMPERATURE: 97.8 F | DIASTOLIC BLOOD PRESSURE: 86 MMHG | HEART RATE: 111 BPM | SYSTOLIC BLOOD PRESSURE: 138 MMHG | OXYGEN SATURATION: 90 %

## 2020-01-01 VITALS
HEART RATE: 74 BPM | BODY MASS INDEX: 12.73 KG/M2 | HEIGHT: 68 IN | DIASTOLIC BLOOD PRESSURE: 81 MMHG | SYSTOLIC BLOOD PRESSURE: 166 MMHG | TEMPERATURE: 97.9 F | OXYGEN SATURATION: 97 % | WEIGHT: 84 LBS | RESPIRATION RATE: 14 BRPM

## 2020-01-01 VITALS
TEMPERATURE: 98 F | DIASTOLIC BLOOD PRESSURE: 60 MMHG | OXYGEN SATURATION: 98 % | HEART RATE: 104 BPM | SYSTOLIC BLOOD PRESSURE: 120 MMHG

## 2020-01-01 VITALS
OXYGEN SATURATION: 96 % | TEMPERATURE: 98.1 F | HEART RATE: 94 BPM | SYSTOLIC BLOOD PRESSURE: 122 MMHG | DIASTOLIC BLOOD PRESSURE: 56 MMHG

## 2020-01-01 VITALS
SYSTOLIC BLOOD PRESSURE: 112 MMHG | OXYGEN SATURATION: 98 % | TEMPERATURE: 97.2 F | DIASTOLIC BLOOD PRESSURE: 68 MMHG | HEART RATE: 92 BPM

## 2020-01-01 VITALS
DIASTOLIC BLOOD PRESSURE: 80 MMHG | TEMPERATURE: 97.3 F | HEART RATE: 88 BPM | OXYGEN SATURATION: 94 % | SYSTOLIC BLOOD PRESSURE: 122 MMHG | RESPIRATION RATE: 18 BRPM

## 2020-01-01 DIAGNOSIS — C61 PROSTATE CANCER METASTATIC TO BONE (HCC): Primary | ICD-10-CM

## 2020-01-01 DIAGNOSIS — C61 MALIGNANT NEOPLASM OF PROSTATE (HCC): ICD-10-CM

## 2020-01-01 DIAGNOSIS — C79.51 PROSTATE CANCER METASTATIC TO BONE (HCC): Primary | ICD-10-CM

## 2020-01-01 LAB
ABO + RH BLD: NORMAL
AMPHET UR QL SCN: NEGATIVE
BARBITURATES UR QL SCN: NEGATIVE
BASO+EOS+MONOS # BLD AUTO: 0.3 K/UL (ref 0–2.3)
BASO+EOS+MONOS NFR BLD AUTO: 4 % (ref 0.1–17)
BASOPHILS # BLD: 0 K/UL (ref 0–0.1)
BASOPHILS NFR BLD: 0 % (ref 0–2)
BENZODIAZ UR QL: NEGATIVE
BLD PROD TYP BPU: NORMAL
BLD PROD TYP BPU: NORMAL
BLOOD GROUP ANTIBODIES SERPL: NORMAL
BPU ID: NORMAL
BPU ID: NORMAL
CANNABINOIDS UR QL SCN: POSITIVE
COCAINE UR QL SCN: NEGATIVE
CROSSMATCH RESULT,%XM: NORMAL
CROSSMATCH RESULT,%XM: NORMAL
DIFFERENTIAL METHOD BLD: ABNORMAL
DIFFERENTIAL METHOD BLD: ABNORMAL
EOSINOPHIL # BLD: 0.1 K/UL (ref 0–0.4)
EOSINOPHIL NFR BLD: 2 % (ref 0–5)
ERYTHROCYTE [DISTWIDTH] IN BLOOD BY AUTOMATED COUNT: 16.5 % (ref 11.5–14.5)
ERYTHROCYTE [DISTWIDTH] IN BLOOD BY AUTOMATED COUNT: 18.4 % (ref 11.6–14.5)
HCT VFR BLD AUTO: 26.8 % (ref 36–48)
HCT VFR BLD AUTO: 34.7 % (ref 36–48)
HDSCOM,HDSCOM: ABNORMAL
HGB BLD-MCNC: 11.8 G/DL (ref 12–16)
HGB BLD-MCNC: 8.8 G/DL (ref 13–16)
LYMPHOCYTES # BLD: 1.8 K/UL (ref 0.9–3.6)
LYMPHOCYTES # BLD: 1.9 K/UL (ref 1.1–5.9)
LYMPHOCYTES NFR BLD: 27 % (ref 14–44)
LYMPHOCYTES NFR BLD: 29 % (ref 21–52)
MCH RBC QN AUTO: 27.3 PG (ref 24–34)
MCH RBC QN AUTO: 29 PG (ref 25–35)
MCHC RBC AUTO-ENTMCNC: 32.8 G/DL (ref 31–37)
MCHC RBC AUTO-ENTMCNC: 34 G/DL (ref 31–37)
MCV RBC AUTO: 83.2 FL (ref 74–97)
MCV RBC AUTO: 85.3 FL (ref 78–102)
METHADONE UR QL: NEGATIVE
MONOCYTES # BLD: 0.4 K/UL (ref 0.05–1.2)
MONOCYTES NFR BLD: 6 % (ref 3–10)
NEUTS SEG # BLD: 3.9 K/UL (ref 1.8–8)
NEUTS SEG # BLD: 4.8 K/UL (ref 1.8–9.5)
NEUTS SEG NFR BLD: 63 % (ref 40–73)
NEUTS SEG NFR BLD: 69 % (ref 40–70)
OPIATES UR QL: NEGATIVE
PCP UR QL: NEGATIVE
PLATELET # BLD AUTO: 177 K/UL (ref 135–420)
PLATELET # BLD AUTO: 221 K/UL (ref 135–420)
PMV BLD AUTO: 11 FL (ref 9.2–11.8)
RBC # BLD AUTO: 3.22 M/UL (ref 4.7–5.5)
RBC # BLD AUTO: 4.07 M/UL (ref 4.1–5.1)
SPECIMEN EXP DATE BLD: NORMAL
STATUS OF UNIT,%ST: NORMAL
STATUS OF UNIT,%ST: NORMAL
UNIT DIVISION, %UDIV: 0
UNIT DIVISION, %UDIV: 0
WBC # BLD AUTO: 6.2 K/UL (ref 4.6–13.2)
WBC # BLD AUTO: 7 K/UL (ref 4.5–13)

## 2020-01-01 PROCEDURE — 74011636320 HC RX REV CODE- 636/320: Performed by: UROLOGY

## 2020-01-01 PROCEDURE — 36430 TRANSFUSION BLD/BLD COMPNT: CPT

## 2020-01-01 PROCEDURE — 36415 COLL VENOUS BLD VENIPUNCTURE: CPT

## 2020-01-01 PROCEDURE — 77030012510 HC MSK AIRWY LMA TELE -B: Performed by: ANESTHESIOLOGY

## 2020-01-01 PROCEDURE — 76010000138 HC OR TIME 0.5 TO 1 HR: Performed by: UROLOGY

## 2020-01-01 PROCEDURE — G0299 HHS/HOSPICE OF RN EA 15 MIN: HCPCS

## 2020-01-01 PROCEDURE — 74011250636 HC RX REV CODE- 250/636: Performed by: UROLOGY

## 2020-01-01 PROCEDURE — 74011250636 HC RX REV CODE- 250/636: Performed by: NURSE ANESTHETIST, CERTIFIED REGISTERED

## 2020-01-01 PROCEDURE — 76210000016 HC OR PH I REC 1 TO 1.5 HR: Performed by: UROLOGY

## 2020-01-01 PROCEDURE — 51798 US URINE CAPACITY MEASURE: CPT

## 2020-01-01 PROCEDURE — 74011250637 HC RX REV CODE- 250/637: Performed by: RADIOLOGY

## 2020-01-01 PROCEDURE — 80307 DRUG TEST PRSMV CHEM ANLYZR: CPT

## 2020-01-01 PROCEDURE — G0158 HHC OT ASSISTANT EA 15: HCPCS

## 2020-01-01 PROCEDURE — 77030020269 HC MISC IMPL: Performed by: UROLOGY

## 2020-01-01 PROCEDURE — 400013 HH SOC

## 2020-01-01 PROCEDURE — 77030018836 HC SOL IRR NACL ICUM -A: Performed by: UROLOGY

## 2020-01-01 PROCEDURE — G0157 HHC PT ASSISTANT EA 15: HCPCS

## 2020-01-01 PROCEDURE — 77030013169 SET IV BLD ICUM -A

## 2020-01-01 PROCEDURE — 74011250636 HC RX REV CODE- 250/636: Performed by: RADIOLOGY

## 2020-01-01 PROCEDURE — A9606 RADIUM RA223 DICHLORIDE THER: HCPCS | Performed by: RADIOLOGY

## 2020-01-01 PROCEDURE — C2617 STENT, NON-COR, TEM W/O DEL: HCPCS | Performed by: UROLOGY

## 2020-01-01 PROCEDURE — 74011250637 HC RX REV CODE- 250/637: Performed by: ANESTHESIOLOGY

## 2020-01-01 PROCEDURE — G0152 HHCP-SERV OF OT,EA 15 MIN: HCPCS

## 2020-01-01 PROCEDURE — 74420 UROGRAPHY RTRGR +-KUB: CPT

## 2020-01-01 PROCEDURE — G0151 HHCP-SERV OF PT,EA 15 MIN: HCPCS

## 2020-01-01 PROCEDURE — 85049 AUTOMATED PLATELET COUNT: CPT

## 2020-01-01 PROCEDURE — P9016 RBC LEUKOCYTES REDUCED: HCPCS

## 2020-01-01 PROCEDURE — C1769 GUIDE WIRE: HCPCS | Performed by: UROLOGY

## 2020-01-01 PROCEDURE — 86900 BLOOD TYPING SEROLOGIC ABO: CPT

## 2020-01-01 PROCEDURE — 74011250636 HC RX REV CODE- 250/636: Performed by: ANESTHESIOLOGY

## 2020-01-01 PROCEDURE — 85025 COMPLETE CBC W/AUTO DIFF WBC: CPT

## 2020-01-01 PROCEDURE — 76060000032 HC ANESTHESIA 0.5 TO 1 HR: Performed by: UROLOGY

## 2020-01-01 PROCEDURE — G0300 HHS/HOSPICE OF LPN EA 15 MIN: HCPCS

## 2020-01-01 PROCEDURE — 99211 OFF/OP EST MAY X REQ PHY/QHP: CPT

## 2020-01-01 PROCEDURE — C1758 CATHETER, URETERAL: HCPCS | Performed by: UROLOGY

## 2020-01-01 PROCEDURE — 76210000024 HC REC RM PH II 2.5 TO 3 HR: Performed by: UROLOGY

## 2020-01-01 PROCEDURE — 86923 COMPATIBILITY TEST ELECTRIC: CPT

## 2020-01-01 PROCEDURE — 79101 NUCLEAR RX IV ADMIN: CPT

## 2020-01-01 PROCEDURE — 74011000258 HC RX REV CODE- 258: Performed by: UROLOGY

## 2020-01-01 PROCEDURE — 74011250637 HC RX REV CODE- 250/637: Performed by: NURSE ANESTHETIST, CERTIFIED REGISTERED

## 2020-01-01 RX ORDER — SODIUM CHLORIDE 0.9 % (FLUSH) 0.9 %
5-40 SYRINGE (ML) INJECTION EVERY 8 HOURS
Status: DISCONTINUED | OUTPATIENT
Start: 2020-01-01 | End: 2020-01-01 | Stop reason: HOSPADM

## 2020-01-01 RX ORDER — FENTANYL CITRATE 50 UG/ML
25 INJECTION, SOLUTION INTRAMUSCULAR; INTRAVENOUS AS NEEDED
Status: DISCONTINUED | OUTPATIENT
Start: 2020-01-01 | End: 2020-01-01 | Stop reason: HOSPADM

## 2020-01-01 RX ORDER — SODIUM CHLORIDE, SODIUM LACTATE, POTASSIUM CHLORIDE, CALCIUM CHLORIDE 600; 310; 30; 20 MG/100ML; MG/100ML; MG/100ML; MG/100ML
25 INJECTION, SOLUTION INTRAVENOUS CONTINUOUS
Status: DISCONTINUED | OUTPATIENT
Start: 2020-01-01 | End: 2020-01-01 | Stop reason: HOSPADM

## 2020-01-01 RX ORDER — ACETAMINOPHEN 325 MG/1
650 TABLET ORAL ONCE
Status: COMPLETED | OUTPATIENT
Start: 2020-01-01 | End: 2020-01-01

## 2020-01-01 RX ORDER — ONDANSETRON 2 MG/ML
4 INJECTION INTRAMUSCULAR; INTRAVENOUS ONCE
Status: COMPLETED | OUTPATIENT
Start: 2020-01-01 | End: 2020-01-01

## 2020-01-01 RX ORDER — SODIUM CHLORIDE 0.9 % (FLUSH) 0.9 %
5-10 SYRINGE (ML) INJECTION ONCE
Status: COMPLETED | OUTPATIENT
Start: 2020-01-01 | End: 2020-01-01

## 2020-01-01 RX ORDER — SODIUM CHLORIDE 0.9 % (FLUSH) 0.9 %
10-40 SYRINGE (ML) INJECTION AS NEEDED
Status: DISCONTINUED | OUTPATIENT
Start: 2020-01-01 | End: 2020-01-01 | Stop reason: HOSPADM

## 2020-01-01 RX ORDER — MIDAZOLAM HYDROCHLORIDE 1 MG/ML
INJECTION, SOLUTION INTRAMUSCULAR; INTRAVENOUS AS NEEDED
Status: DISCONTINUED | OUTPATIENT
Start: 2020-01-01 | End: 2020-01-01 | Stop reason: HOSPADM

## 2020-01-01 RX ORDER — SODIUM CHLORIDE 0.9 % (FLUSH) 0.9 %
5-40 SYRINGE (ML) INJECTION AS NEEDED
Status: DISCONTINUED | OUTPATIENT
Start: 2020-01-01 | End: 2020-01-01 | Stop reason: HOSPADM

## 2020-01-01 RX ORDER — FAMOTIDINE 20 MG/1
20 TABLET, FILM COATED ORAL ONCE
Status: COMPLETED | OUTPATIENT
Start: 2020-01-01 | End: 2020-01-01

## 2020-01-01 RX ORDER — GENTAMICIN SULFATE 100 MG/100ML
INJECTION, SOLUTION INTRAVENOUS AS NEEDED
Status: DISCONTINUED | OUTPATIENT
Start: 2020-01-01 | End: 2020-01-01 | Stop reason: HOSPADM

## 2020-01-01 RX ORDER — SODIUM CHLORIDE 9 MG/ML
250 INJECTION, SOLUTION INTRAVENOUS AS NEEDED
Status: DISCONTINUED | OUTPATIENT
Start: 2020-01-01 | End: 2020-01-01 | Stop reason: HOSPADM

## 2020-01-01 RX ORDER — PROPOFOL 10 MG/ML
INJECTION, EMULSION INTRAVENOUS AS NEEDED
Status: DISCONTINUED | OUTPATIENT
Start: 2020-01-01 | End: 2020-01-01 | Stop reason: HOSPADM

## 2020-01-01 RX ORDER — ONDANSETRON 2 MG/ML
INJECTION INTRAMUSCULAR; INTRAVENOUS AS NEEDED
Status: DISCONTINUED | OUTPATIENT
Start: 2020-01-01 | End: 2020-01-01 | Stop reason: HOSPADM

## 2020-01-01 RX ORDER — FENTANYL CITRATE 50 UG/ML
INJECTION, SOLUTION INTRAMUSCULAR; INTRAVENOUS AS NEEDED
Status: DISCONTINUED | OUTPATIENT
Start: 2020-01-01 | End: 2020-01-01 | Stop reason: HOSPADM

## 2020-01-01 RX ORDER — SODIUM CHLORIDE, SODIUM LACTATE, POTASSIUM CHLORIDE, CALCIUM CHLORIDE 600; 310; 30; 20 MG/100ML; MG/100ML; MG/100ML; MG/100ML
50 INJECTION, SOLUTION INTRAVENOUS CONTINUOUS
Status: DISCONTINUED | OUTPATIENT
Start: 2020-01-01 | End: 2020-01-01 | Stop reason: HOSPADM

## 2020-01-01 RX ORDER — HYDROCODONE BITARTRATE AND ACETAMINOPHEN 5; 325 MG/1; MG/1
1 TABLET ORAL AS NEEDED
Status: COMPLETED | OUTPATIENT
Start: 2020-01-01 | End: 2020-01-01

## 2020-01-01 RX ORDER — SODIUM CHLORIDE 9 MG/ML
75 INJECTION, SOLUTION INTRAVENOUS ONCE
Status: COMPLETED | OUTPATIENT
Start: 2020-01-01 | End: 2020-01-01

## 2020-01-01 RX ORDER — DIPHENHYDRAMINE HCL 25 MG
25 CAPSULE ORAL ONCE
Status: COMPLETED | OUTPATIENT
Start: 2020-01-01 | End: 2020-01-01

## 2020-01-01 RX ADMIN — Medication 10 ML: at 16:05

## 2020-01-01 RX ADMIN — DIPHENHYDRAMINE HYDROCHLORIDE 25 MG: 25 CAPSULE ORAL at 10:12

## 2020-01-01 RX ADMIN — SODIUM CHLORIDE 75 ML/HR: 900 INJECTION, SOLUTION INTRAVENOUS at 12:32

## 2020-01-01 RX ADMIN — PROPOFOL 130 MG: 10 INJECTION, EMULSION INTRAVENOUS at 12:22

## 2020-01-01 RX ADMIN — FAMOTIDINE 20 MG: 20 TABLET ORAL at 10:12

## 2020-01-01 RX ADMIN — SODIUM CHLORIDE, SODIUM LACTATE, POTASSIUM CHLORIDE, AND CALCIUM CHLORIDE: 600; 310; 30; 20 INJECTION, SOLUTION INTRAVENOUS at 12:14

## 2020-01-01 RX ADMIN — FENTANYL CITRATE 25 MCG: 50 INJECTION, SOLUTION INTRAMUSCULAR; INTRAVENOUS at 12:27

## 2020-01-01 RX ADMIN — SODIUM CHLORIDE 250 ML: 900 INJECTION, SOLUTION INTRAVENOUS at 10:00

## 2020-01-01 RX ADMIN — GENTAMICIN SULFATE 344 MG: 40 INJECTION, SOLUTION INTRAMUSCULAR; INTRAVENOUS at 11:50

## 2020-01-01 RX ADMIN — ONDANSETRON 4 MG: 2 SOLUTION INTRAMUSCULAR; INTRAVENOUS at 12:50

## 2020-01-01 RX ADMIN — GENTAMICIN SULFATE 344 MG: 100 INJECTION, SOLUTION INTRAVENOUS at 12:22

## 2020-01-01 RX ADMIN — Medication 10 ML: at 09:45

## 2020-01-01 RX ADMIN — Medication 10 ML: at 12:32

## 2020-01-01 RX ADMIN — HYDROCODONE BITARTRATE AND ACETAMINOPHEN 1 TABLET: 5; 325 TABLET ORAL at 14:53

## 2020-01-01 RX ADMIN — SODIUM CHLORIDE, SODIUM LACTATE, POTASSIUM CHLORIDE, AND CALCIUM CHLORIDE 50 ML/HR: 600; 310; 30; 20 INJECTION, SOLUTION INTRAVENOUS at 10:12

## 2020-01-01 RX ADMIN — SODIUM CHLORIDE, SODIUM LACTATE, POTASSIUM CHLORIDE, AND CALCIUM CHLORIDE 25 ML/HR: 600; 310; 30; 20 INJECTION, SOLUTION INTRAVENOUS at 15:02

## 2020-01-01 RX ADMIN — ACETAMINOPHEN 650 MG: 325 TABLET, FILM COATED ORAL at 10:12

## 2020-01-01 RX ADMIN — ONDANSETRON 4 MG: 2 INJECTION INTRAMUSCULAR; INTRAVENOUS at 13:27

## 2020-01-01 RX ADMIN — MIDAZOLAM 1 MG: 1 INJECTION INTRAMUSCULAR; INTRAVENOUS at 12:14

## 2020-01-01 RX ADMIN — FENTANYL CITRATE 25 MCG: 50 INJECTION, SOLUTION INTRAMUSCULAR; INTRAVENOUS at 13:27

## 2020-01-01 RX ADMIN — RADIUM RA 223 DICHLORIDE 61.34 MICRO CURIE: 30 INJECTION INTRAVENOUS at 12:30

## 2020-01-06 NOTE — PERIOP NOTES
PAT - SURGICAL PRE-ADMISSION INSTRUCTIONS    NAME:  Lyly Cuevas                                                          TODAY'S DATE:  1/6/2020    SURGERY DATE:  1/8/2020                                  SURGERY ARRIVAL TIME:   1000    1. Do NOT eat or drink anything, including candy or gum, after MIDNIGHT on 01/07 , unless you have specific instructions from your Surgeon or Anesthesia Provider to do so. 2. No smoking on the day of surgery. 3. No alcohol 24 hours prior to the day of surgery. 4. No recreational drugs for one week prior to the day of surgery. 5. Leave all valuables, including money/purse, at home. 6. Remove all jewelry, nail polish, makeup (including mascara); no lotions, powders, deodorant, or perfume/cologne/after shave. 7. Glasses/Contact lenses and Dentures may be worn to the hospital.  They will be removed prior to surgery. 8. Call your doctor if symptoms of a cold or illness develop within 24 ours prior to surgery. 9. AN ADULT MUST DRIVE YOU HOME AFTER OUTPATIENT SURGERY. 10. If you are having an OUTPATIENT procedure, please make arrangements for a responsible adult to be with you for 24 hours after your surgery. 11. If you are admitted to the hospital, you will be assigned to a bed after surgery is complete. Normally a family member will not be able to see you until you are in your assigned bed. 15. Family is encouraged to accompany you to the hospital.  We ask visitors in the treatment area to be limited to ONE person at a time to ensure patient privacy. EXCEPTIONS WILL BE MADE AS NEEDED. 15. Children under 12 are discouraged from entering the treatment area and need to be supervised by an adult when in the waiting room. Special Instructions:    NONE. Patient Prep:    shower with anti-bacterial soap    These surgical instructions were reviewed with [facility] during the PAT phone interview . Directions:   On the morning of surgery, please go to the [facility]. Enter the building from the [facility] entrance, 1st floor (next to the Emergency Room entrance). Take the elevator to the 2nd floor. Sign in at the Registration Desk.     If you have any questions and/or concerns, please do not hesitate to call:  (Prior to the day of surgery)  [facility] [place] unit:  [phone]  (Day of surgery)   unit:  [phone]

## 2020-01-08 NOTE — PERIOP NOTES
Pre-Op Summary    Pt arrived via car with family/friend and is oriented to time, place, person and situation. Patient with unsteady gait with walker assistive devices. Visit Vitals  /84 (BP 1 Location: Left arm, BP Patient Position: At rest)   Pulse 79   Temp 97.9 °F (36.6 °C)   Resp 16   Ht 5' 8\" (1.727 m)   Wt 38.1 kg (84 lb)   SpO2 100%   BMI 12.77 kg/m²       Peripheral IV located on Left hand . Patients belongings are located with cousin. Patient's point of contact is Xenia Tim and their contact number is:  [phone]. They will be in the waiting room. They are able to receive medication information. They will be their ride home.

## 2020-01-08 NOTE — DISCHARGE INSTRUCTIONS
Patient Education   Patient armband removed and given to patient to take home. Patient was informed of the privacy risks if armband lost or stolen    DISCHARGE SUMMARY from Nurse    PATIENT INSTRUCTIONS:    After general anesthesia or intravenous sedation, for 24 hours or while taking prescription Narcotics:  · Limit your activities  · Do not drive and operate hazardous machinery  · Do not make important personal or business decisions  · Do  not drink alcoholic beverages  · If you have not urinated within 8 hours after discharge, please contact your surgeon on call. Report the following to your surgeon:  · Excessive pain, swelling, redness or odor of or around the surgical area  · Temperature over 100.5  · Nausea and vomiting lasting longer than 4 hours or if unable to take medications  · Any signs of decreased circulation or nerve impairment to extremity: change in color, persistent  numbness, tingling, coldness or increase pain  · Any questions    What to do at Home:  These are general instructions for a healthy lifestyle:    No smoking/ No tobacco products/ Avoid exposure to second hand smoke  Surgeon General's Warning:  Quitting smoking now greatly reduces serious risk to your health. Obesity, smoking, and sedentary lifestyle greatly increases your risk for illness    A healthy diet, regular physical exercise & weight monitoring are important for maintaining a healthy lifestyle    You may be retaining fluid if you have a history of heart failure or if you experience any of the following symptoms:  Weight gain of 3 pounds or more overnight or 5 pounds in a week, increased swelling in our hands or feet or shortness of breath while lying flat in bed. Please call your doctor as soon as you notice any of these symptoms; do not wait until your next office visit. The discharge information has been reviewed with the patient. The patient verbalized understanding.   Discharge medications reviewed with the patient and appropriate educational materials and side effects teaching were provided. ___________________________________________________________________________________________________________________________________     Cystoscopy: What to Expect at [facility]    A cystoscopy is a procedure that lets a doctor look inside of the bladder and the urethra. The urethra is the tube that carries urine from the bladder to outside the body. The doctor uses a thin, lighted tool called a cystoscope. Your bladder is filled with fluid. This stretches the bladder so that your doctor can look closely at the inside of your bladder. After the cystoscopy, your urethra may be sore at first, and it may burn when you urinate for the first few days after the procedure. You may feel the need to urinate more often, and your urine may be pink. These symptoms should get better in 1 or 2 days. You will probably be able to go back to most of your usual activities in 1 or 2 days. This care sheet gives you a general idea about how long it will take for you to recover. But each person recovers at a different pace. Follow the steps below to get better as quickly as possible. How can you care for yourself at home? Activity    · Rest when you feel tired. Getting enough sleep will help you recover.     · Try to walk each day. Start by walking a little more than you did the day before. Bit by bit, increase the amount you walk. Walking boosts blood flow and helps prevent pneumonia and constipation.     · Avoid strenuous activities, such as bicycle riding, jogging, weight lifting, or aerobic exercise, until your doctor says it is okay.     · Ask your doctor when you can drive again.     · Most people are able to return to work within 1 or 2 days after the procedure.     · You may shower and take baths as usual.     · Ask your doctor when it is okay for you to have sex. Diet    · You can eat your normal diet.  If your stomach is upset, try bland, low-fat foods like plain rice, broiled chicken, toast, and yogurt.     · Drink plenty of fluids (unless your doctor tells you not to). Medicines    · Take pain medicines exactly as directed. ? If the doctor gave you a prescription medicine for pain, take it as prescribed. ? If you are not taking a prescription pain medicine, ask your doctor if you can take an over-the-counter medicine.     · If you think your pain medicine is making you sick to your stomach:  ? Take your medicine after meals (unless your doctor has told you not to). ? Ask your doctor for a different pain medicine.     · If your doctor prescribed antibiotics, take them as directed. Do not stop taking them just because you feel better. You need to take the full course of antibiotics. Follow-up care is a key part of your treatment and safety. Be sure to make and go to all appointments, and call your doctor if you are having problems. It's also a good idea to know your test results and keep a list of the medicines you take. When should you call for help? Call 911 anytime you think you may need emergency care. For example, call if:    · You passed out (lost consciousness).     · You have severe trouble breathing.     · You have sudden chest pain and shortness of breath, or you cough up blood.     · You have severe belly pain.    Call your doctor now or seek immediate medical care if:    · You are sick to your stomach or cannot keep fluids down.     · Your urine is still red or you see blood clots after you have urinated several times.     · You have trouble passing urine or stool, especially if you have pain or swelling in your lower belly.     · You have signs of a blood clot, such as:  ? Pain in your calf, back of the knee, thigh, or groin. ? Redness and swelling in your leg or groin.     · You develop a fever or severe chills.     · You have pain in your back just below your rib cage.  This is called flank pain.    Watch closely for changes in your health, and be sure to contact your doctor if:    · You have pain or burning when you urinate. A burning feeling is normal for a day or two after the test, but call if it does not get better.     · You have a frequent urge to urinate but can pass only small amounts of urine.     · Your urine is pink, red, or cloudy, or smells bad. It is normal for the urine to have a pinkish color for a few days after the test, but call if it does not get better. Where can you learn more? Go to http://jesus-ashli.info/. Enter Z197 in the search box to learn more about \"Cystoscopy: What to Expect at Home. \"  Current as of: December 19, 2018  Content Version: 12.2  © 3742-3002 [employer]. Care instructions adapted under license by [employer] (which disclaims liability or warranty for this information). If you have questions about a medical condition or this instruction, always ask your healthcare professional. [address] any warranty or liability for your use of this information.

## 2020-01-08 NOTE — PERIOP NOTES
Phase 2 Recovery Summary    Report received from [facility]    Vitals:    01/08/20 1326 01/08/20 1342 01/08/20 1358 01/08/20 1401   BP: 162/73 155/85  166/81   Pulse: 77 75 79 74   Resp: 12 11 16 14   Temp:  97.9 °F (36.6 °C)     SpO2: 100% 99% 98% 97%   Weight:       Height:           oriented to time, place, person and situation    Lines and Drains  Peripheral Intravenous Line: Removed prior to discharge. Peripheral IV 01/08/20 Left Hand (Active)   Site Assessment Clean, dry, & intact 1/8/2020  2:04 PM   Phlebitis Assessment 0 1/8/2020  2:04 PM   Infiltration Assessment 0 1/8/2020  2:04 PM   Dressing Status Clean, dry, & intact 1/8/2020  2:04 PM   Dressing Type Transparent;Tape 1/8/2020  2:04 PM   Hub Color/Line Status Pink; Infusing 1/8/2020  2:04 PM       Patient assisted to chair with minimal assist. Pateint tolerating liquids well. Patient's family at bedside. Pain at 3/10, increasing with ambulation/movement. Requesting pain pill prior to going home. Dr. Nikolay Rockwell (anesthesia) notified. 1 Tab Percocet  given at 1440. Patient had difficulty voiding in phase 2 x 2 hours. . Dr. Roy Ormond notified. Per Dr. Roy Ormond, continue to monitor patient in phase 2. IIV fluids running  and patient tolerating fluids. Assisted to bathroom and patient voided 100 ml urine at 1620. Discharge discussed with patient and family. No questions or concerns at this time. Patient had time to ask any questions. Patient discharged to home, with family.       John Shafer RN

## 2020-01-08 NOTE — PERIOP NOTES
Assumed care of pt from Lady Mclean, [facility] with pt lying quietly on stretcher. No distress noted. VSS. Will cont to monitor.

## 2020-01-08 NOTE — ADDENDUM NOTE
Addendum  created 01/08/20 1432 by Jacinda Asif MD    New alternative orders accepted, Order list changed, Order sets accessed

## 2020-01-08 NOTE — H&P
Kristopher Juarez   1955  60 y.o.  10/15/2019     PROSTATE CANCER ESTABLISHED PATIENT           Encounter Diagnoses       ICD-10-CM ICD-9-CM   1. Malignant neoplasm of prostate ([facility] [address] C61 185   2. History of urinary retention Z87.898 V13.09   3. Vitamin D deficiency E55.9 268. 9         Assessment:  1. 60 y.o. male with [id number] GS 5+4 (Diagnosed 5/10/19), Prostate cancer in 4/4 cores, prostate volume of 34 cm3, Pre-biopsy PSA= 79.050 ng/mL. CT A/P W Cont 19 - Widespread bony metastatic disease. ADT initiated with Degarelix 240mg on 6/3/19. Transitioned to Eligard 45mg on 19. Started on Xtandi 2019                Most recent PSA measured 140 ng/mL on 19, previously 195 ng/mL on 19     2. Obstructive uropathy 2/2 prostatic mass w/moderate right hydroureteronephrosis per CT A/P W Cont on               Right PCNU tube placed 2019. S/p cysto, TURP, right RPG, right ureteral stent placement, right PCNU stent removal on 19 with Dr. Nawaf Canchola. PVR 10/15/19 = 0     3. Urinary retention, catheter placed 3/25/19. VT failed 5/10/2019. Vargas catheter removed 2019. S/p cysto, TURP, right RPG, right ureteral stent placement, right PCNU stent removal on 19 with Dr. Nawaf Canchola.     4. Gross hematuria s/p right PCNU tube placement on 19              S/p right renal angiogram on 19 without evidence of bleeding or vascular abnormality      5. Vit D deficiency, currently on ergocalciferol      6. HTN     Plan:  · Reviewed most recent labs from 19: , T level 6  · PVR post channel TURP today 0  · Repeat PSA, T drawn today, will inform   · Patient never received a Bone Scan for staging.  Bone Scan ordered today to evaluate for areas of bony metastatic disease that could be targeted by palliative XRT  · Referral to Rad/Onc to discuss palliative radiation  · Continue ADT with Eligard 45mg, next injection 2020  · Continue Vit D and Ca supplements to support bone health during ADT  · Continue Xtandi 160mg QHS  · Continue with stent exchanges q6 months. Reviewed R/B. Surgery letter sent. · Re-referred to pain management to discuss management of symptomatic metastatic disease   · RX for oxycodone 5-325 provided to take PRN for pain. Advised no further RXs will be provided as future RXs should come from pain management   · Follow up in 3 months with same day PSA, T, Eligard 45mg, HW for ureteral stent exchange        Chief Complaint:       Chief Complaint   Patient presents with    Prostate Cancer         HPI: Stephanie Mejía is a 60 y.o. male who presents in follow up for recently diagnosed high risk prostate cancer and hx of urinary retention. Patient is s/p prostate biopsy on 5/10/19 for elevated PSA of 79.050 ng/mL, which revealed GS 5+4, prostate cancer in 4/4 cores, TRUS volume of 34 cm3. Patient is also followed for urinary retention after a zelaya catheter was placed on 3/25/19 in the ER. Patient had a VT at the time of the biopsy that was unsuccessful. Zelaya removed 6/2019.     Patient had a CT A/P on 5/31/19 that revealed diffuse bony metastatic disease as well as obstructing uropathy and associated hydroureteronephrosis on the right 2/2 a prostatic mass. A bone scan was not performed. Patient is s/p a right PCNU tube placement on 6/2/19. Since the PCNU tube was placed, patient was c/o gross hematuria. He then had a right renal angiogram on 6/7/19 to further evaluate that was negative for any active bleeding. Patient no longer has a zelaya catheter. Was started on ADT in the hospital with Degarelix 240mg on 6/3/2019. Transitioned to Eligard 45mg injection on 7/18/19.     Patient was a previous patient of Dr. Omkar Toledo. Patient was referred to pain management at [facility] but has not yet had an appointment with them.       Patient presents today with his niece.    Patient is s/p cysto, TURP, right RPG, right ureteral stent placement, right PCNU stent removal on 19 with Dr. Dalia Eastman. Since his procedure, patient is urinating well. PVR today 0cc. Continues on Xtandi 160mg every night. Patient is c/o diffuse pain to his right shoulder and the right side of his lower back and is asking for pain medications.         AUA SS N/A  MEGAN 3 on 2019     PCa Dx DATE: 5/10/19      PSA Trend (ng/ml):  PSA /TESTOSTERONE - BSHSI PSA   Latest Ref Rng & Units 0.00 - 4.100 ng/ml   2019 140   2019 195   2019 79.050 (A)            Past Medical History       Past Medical History:   Diagnosis Date    Depression      Elevated PSA      Hypertension      Prostate cancer metastatic to bone (HCC)      Sleep disorder           Past Surgical History        Past Surgical History:   Procedure Laterality Date    HX UROLOGICAL   05/10/2019     Pnbx-Trus vol 34 cm3. Path: [place] 5+4 in L [place], [place] 4+5 in L [place], R [place], R [place]. Dr. Cb Bergman.  HX UROLOGICAL   2019     PCN    HX UROLOGICAL   2019     CYSTOSCOPY TRANSURETHRAL RESECTION OF PROSTATE, Right ureteral stent placement; Right retrograde pyelogram; Right nephrostomy tube drain removal         Family History  History reviewed.  No pertinent family history.     Social History  Social History            Socioeconomic History    Marital status: SINGLE       Spouse name: Not on file    Number of children: Not on file    Years of education: Not on file    Highest education level: Not on file   Occupational History    Not on file   Social Needs    Financial resource strain: Not on file    Food insecurity:       Worry: Not on file       Inability: Not on file    Transportation needs:       Medical: Not on file       Non-medical: Not on file   Tobacco Use    Smoking status: Former Smoker       Packs/day: 1.00       Years: 45.00       Pack years: 45.00       Last attempt to quit: 2019       Years since quittin.4    Smokeless tobacco: Never Used   Substance and Sexual Activity    Alcohol use: Not Currently    Drug use: Not Currently    Sexual activity: Not on file   Lifestyle    Physical activity:       Days per week: Not on file       Minutes per session: Not on file    Stress: Not on file   Relationships    Social connections:       Talks on phone: Not on file       Gets together: Not on file       Attends Faith service: Not on file       Active member of club or organization: Not on file       Attends meetings of clubs or organizations: Not on file       Relationship status: Not on file    Intimate partner violence:       Fear of current or ex partner: Not on file       Emotionally abused: Not on file       Physically abused: Not on file       Forced sexual activity: Not on file   Other Topics Concern    Not on file   Social History Narrative    Not on file              Allergies   Allergen Reactions    Pcn [Penicillins] Hives           Current Outpatient Medications   Medication Sig    oxyCODONE-acetaminophen (PERCOCET) 5-325 mg per tablet Take 1 Tab by mouth every four (4) hours as needed for Pain for up to 30 days. Max Daily Amount: 6 Tabs.  oxybutynin (DITROPAN) 5 mg tablet Take 1 Tab by mouth three (3) times daily as needed (bladder spasms). Indications: overactive bladder, Needing to Urinate Immediately    enzalutamide (XTANDI) 40 mg capsule Take four capsules by mouth daily at bedtime    thiamine HCL (B-1) 100 mg tablet Take 100 mg by mouth.  senna-docusate (PERICOLACE) 8.6-50 mg per tablet Take 1 Tab by mouth.  mirtazapine (REMERON) 15 mg tablet Take 15 mg by mouth nightly.  amLODIPine (NORVASC) 5 mg tablet Take  by mouth.     tamsulosin (FLOMAX) 0.4 mg capsule Take  by mouth.      No current facility-administered medications for this visit.          Review of Systems  Constitutional: Fever: No  Skin: Rash: No  HEENT: Hearing difficulty: No  Eyes: Blurred vision: No  Cardiovascular: Chest pain: No  Respiratory: Shortness of breath: No  Gastrointestinal: Nausea/vomiting: No  Musculoskeletal: Back pain: No  Neurological: Weakness: No  Psychological: Memory loss: No  Comments/additional findings:         Physical Exam:  Visit Vitals  /62   Ht 5' 6\" (1.676 m)   Wt 101 lb (45.8 kg)   BMI 16.30 kg/m²      Constitutional: WDWN,No acute distress. HEENT: EOMs in tact  CV:  No peripheral swelling noted  Respiratory: No respiratory distress or difficulties  : Right PCNU to leg bag, urine very dark and cloudy  Skin: No jaundice. Neuro/Psych:  Alert and oriented x 3. Affect appropriate. EXT: no clubbing or cyanosis             Labs reviewed today:         Results for orders placed or performed in visit on 10/15/19   AMB POC URINALYSIS DIP STICK AUTO W/O MICRO   Result Value Ref Range     Color (UA POC)         Clarity (UA POC)         Glucose (UA POC) Negative Negative     Bilirubin (UA POC) Negative Negative     Ketones (UA POC) Negative Negative     Specific gravity (UA POC) 1.015 1.001 - 1.035     Blood (UA POC) 2+ Negative     pH (UA POC) 6.0 4.6 - 8.0     Protein (UA POC) 2+ Negative     Urobilinogen (UA POC) 0.2 mg/dL 0.2 - 1     Nitrites (UA POC) Negative Negative     Leukocyte esterase (UA POC) 3+ Negative   AMB POC PVR, JEAN PIERRE,POST-VOID RES,US,NON-IMAGING   Result Value Ref Range     PVR 0 cc         Gautam Srinivasan MD  [facility]  [address] [facility], [address] [facility]  Phone: [phone]  Pager: [phone]     CC: Brandi Pal MD      Patient's BMI is out of the normal parameters. Information about BMI was given and patient was advised to follow-up with their PCP for further management.     Medical documentation entered into the chart by Ede Green medical scribe for Gautam Srinivasan MD on 10/15/2019. History and physical review. The patient has been examined. There have been no significant clinical changes.     NAD, CTAB, RRR    Right Side marked  [facility] On call to [address] [place] [address] to proceed with Cysto, right rpg, right stent exchange    Philippe Ren MD

## 2020-01-08 NOTE — ANESTHESIA PREPROCEDURE EVALUATION
Relevant Problems   No relevant active problems       Anesthetic History   No history of anesthetic complications            Review of Systems / Medical History  Patient summary reviewed, nursing notes reviewed and pertinent labs reviewed    Pulmonary          Smoker         Neuro/Psych         Psychiatric history     Cardiovascular    Hypertension: well controlled                Comments: Moderate LVH noted on ECG. GI/Hepatic/Renal                Endo/Other        Cancer     Other Findings   Comments: Chronic pain 2/2 diffuse grzegorz mets. H/o chronic substance abuse. Awaiting UDS (urinary drug screen) results. Physical Exam    Airway  Mallampati: II  TM Distance: 4 - 6 cm  Neck ROM: normal range of motion        Cardiovascular  Regular rate and rhythm,  S1 and S2 normal,  no murmur, click, rub, or gallop  Rhythm: regular  Rate: normal         Dental    Dentition: Poor dentition and Loose teeth  Comments: Missing most upper teeth; risk of dental trauma discussed with patient.    Pulmonary  Breath sounds clear to auscultation               Abdominal  GI exam deferred       Other Findings            Anesthetic Plan    ASA: 3  Anesthesia type: general          Induction: Intravenous  Anesthetic plan and risks discussed with: Patient

## 2020-01-08 NOTE — ANESTHESIA POSTPROCEDURE EVALUATION
Procedure(s):  CYSTOSCOPY WITH right  RETROGRADE pyelogram right stent exchange. general    Anesthesia Post Evaluation      Multimodal analgesia: multimodal analgesia used between 6 hours prior to anesthesia start to PACU discharge  Patient location during evaluation: PACU  Patient participation: complete - patient participated  Level of consciousness: awake  Pain score: 0  Pain management: adequate  Airway patency: patent  Anesthetic complications: no  Cardiovascular status: acceptable  Respiratory status: acceptable  Hydration status: acceptable  Post anesthesia nausea and vomiting:  none      Vitals Value Taken Time   /82 1/8/2020  1:21 PM   Temp 36.3 °C (97.4 °F) 1/8/2020 12:54 PM   Pulse 77 1/8/2020  1:26 PM   Resp 12 1/8/2020  1:26 PM   SpO2 100 % 1/8/2020  1:26 PM   Vitals shown include unvalidated device data.

## 2020-01-08 NOTE — PERIOP NOTES
Patient unable to void. Bladder scan shows 217 ml.    IV fluids running and patient drinking fluids. Paged Dr. Luz Beth . Continue to hold patient in phase 2 and monitor. Continue IV fluid and PO fluids.

## 2020-01-09 NOTE — OP NOTES
[facility]  OPERATIVE REPORT    Name:  Hellen Calderon  MR#:   [id number]  :  1955  ACCOUNT #:  [de-identified]  DATE OF SERVICE:  2020    PREOPERATIVE DIAGNOSIS:  Right malignant ureteral obstruction. POSTOPERATIVE DIAGNOSIS:  Right malignant ureteral obstruction. PROCEDURE PERFORMED:  1. Cystoscopy. 2.  Right retrograde pyelogram with intraoperative interpretation. 3.  Right ureteral stent exchange. SURGEON:  Daphne Finn MD    ASSISTANT:  Graciela Erickson MD    ANESTHESIA:  General.    FLUIDS:  Crystalloid. SPECIMENS REMOVED:  None. DRAINS:  A 6-Uzbek x 24 cm Bard double pigtail ureteral stent. ESTIMATED BLOOD LOSS:  Minimal.    INDICATIONS FOR PROCEDURE:  A 14-year-old male with advanced prostate cancer, now malignant right ureteral obstruction, managed with chronic ureteral stents. Risks, benefits and alternatives were explained and the patient decided to proceed. PROCEDURE IN DETAIL:  The patient was first identified in the holding area and taken back to the operating room. Perioperative antibiotics were given. Sequential compression devices placed. Anesthesia was induced. The patient placed in dorsal lithotomy position taking care to pad all pressure points. The patient was prepped and draped in usual sterile fashion. Time-out was performed. First, a 22-Uzbek rigid cystoscope was inserted into the patient's bladder. Systematic cystoscopy was performed. A 0.038 wire was used to cannulate the right ureteral orifice next to the right ureteral stent. Wire was secured to the drape and the stent was removed intact. A 5-Uzbek open-ended catheter was advanced over the wire up to level of the renal pelvis. Aspirate was obtained. 5 mL of clear fluid was noted. Right retrograde pyelogram with intraoperative interpretation was performed. Mild hydronephrosis with no evidence of filling defects or extravasation was noted. A 0.038 wire was placed.   An open-ended catheter was removed under fluoroscopy. A 6-Kittitian x 24 cm stent was deployed. Good proximal and distal curls were noted. Cystoscope was inserted into the bladder and the distal curl was reviewed. Bladder was drained. Case came to a conclusion.         Russell Jensen MD      [id number]  D:  01/08/2020 12:59  T:  01/08/2020 23:53  JOB #:  [id number]

## 2020-02-03 NOTE — PROGRESS NOTES
[facility] Progress Note Date: February 3, 2020 Name: Esthela White MRN: [id number] : 1955 Peripheral Lab Draw Mr. Bib Daugherty to [facility], [place] [id number] [phone] accompanied by self. Pt was assessed and education was provided. Mr. Jose Bueno vitals were reviewed and patient was observed for 5 minutes prior to treatment. Blood obtained peripherally from left arm x 1 attempt with butterfly needle and sent to lab for Type and Crossmatch per written orders. No bleeding or hematoma noted at site. Gauze and coban applied. Mr. Bib Daugherty tolerated the phlebotomy, and had no complaints. Patient armband removed and shredded. Mr. Bib Daugherty was discharged from [place] [address] in stable condition at 1425. Lakeshia Solomon Phlebotomist PCT February 3, 2020 
2:51 PM

## 2020-02-05 NOTE — PROGRESS NOTES
[facility] Progress Note    Date: 2020    Name: Carina Beltre    MRN: [id number]         : 1955     Blood Transfusion 2 units      Mr. Shimon Kang arrived to [facility] at 0930 accompanied by self. Mr. Shimon Kang was assessed and education was provided. Discussed risks and benefits of blood transfusion with patient, including risk of transfusion reaction and disease transmission. Patient verbalized understanding and signed consent placed on chart. Mr. Thais Ramírez vitals were reviewed. Visit Vitals  /87   Pulse 89   Temp 97 °F (36.1 °C)   Resp 18   SpO2 100%       20 g IV inserted in patient's Left antecubital left, condition patent and no redness x1 attempt. Positive for blood return and flushes without difficulty. Normal saline initiated at [facility]. Pre-medications (Tylenol 650 mg and Benadryl 25 mg) were administered as ordered. First unit of two ordered units of PRBCs initiated @ 75 ml/hr at 1045. Fifteen minutes into infusion, VS stable and pt denied c/o SOB, itching/hives, lip/tongue/facial swelling, CP or other complaints. Infusion rate increased to 100 ml/hr. Fifteen minutes later, VS stable and pt denied complaints; infusion rate increased to 150 ml/hr for the remainder of the transfusion. Unit finished @ 1307. VS stable and no transfusion reaction suspected. Second unit of two ordered units of PRBCs initiated @ 75 ml/hr at 1325. Fifteen minutes into infusion, VS stable and pt denied c/o SOB, itching/hives, lip/tongue/facial swelling, CP or other complaints. Infusion rate increased to 100 ml/hr. Fifteen minutes later, VS stable and pt denied complaints; infusion rate increased to 150 ml/hr for the remainder of the transfusion. Unit finished @ 32 61 16. VS stable and no transfusion reaction suspected. Post CBC collected and processed per order. Mr. Shimon Kang tolerated infusion without complaints. IV removed. No irritation, bleeding, or hematoma noted at site.  Gauze and coban applied to site. Patient remained in [facility] for 30 minutes for observation. No signs of allergic reaction noted. Discharge instructions reviewed with pt. Pt instructed to report SOB, CP, elevated temp, back pain, or other symptoms of transfusion reaction to MD or ED. Pt verbalized understanding. Patient armband removed and shredded    Mr. Higinio Lazaro was discharged from [place] [address] in stable condition at 1610. He is to follow up with ketty.     Gela Collins RN  February 5, 2020

## 2020-02-11 NOTE — PROGRESS NOTES
Patient in Radiation therapy for First Xofigo injection. Per protocol IV started to Right AC, 22 gauge Angiocath and 500ml bolus of normal saline given. Dr. Ingrid Mora gave IV dose of Xofigo 61.34 microcurie over one minute followed by Normal saline flush. Second 500ml bolus of Normal Saline given. IV site discontinued to Right AC 22 gauge Angiocath tip intact without any complications and placed all supplies used in \"[employer]\". Dr. Yenifer Welch in room to measure radiation none noted. Pt. monitored for 20 minutes prior to DC for any adverse reactions.

## 2020-03-10 ENCOUNTER — APPOINTMENT (OUTPATIENT)
Dept: RADIATION THERAPY | Age: 65
End: 2020-03-10

## (undated) DEVICE — TUBING IRRIG L77IN DIA0.241IN L BOR FOR CYSTO W/ NVENT

## (undated) DEVICE — GARMENT,MEDLINE,DVT,INT,CALF,MED, GEN2: Brand: MEDLINE

## (undated) DEVICE — SYR IRR CATH TIP LR ADPT 70ML -- CONVERT TO ITEM 363120

## (undated) DEVICE — STERILE LATEX POWDER-FREE SURGICAL GLOVESWITH NITRILE COATING: Brand: PROTEXIS

## (undated) DEVICE — CONTAINER DRN 20L DISP FLUIDRN LLS

## (undated) DEVICE — SOLUTION IRRIG 1000ML H2O STRL BLT

## (undated) DEVICE — GDWIRE 3CM FLX-TIP 0.038X150CM -- BX/5 SENSOR

## (undated) DEVICE — SOLUTION IV 1000ML 0.9% SOD CHL

## (undated) DEVICE — KENDALL SCD EXPRESS SLEEVES, KNEE LENGTH, MEDIUM: Brand: KENDALL SCD

## (undated) DEVICE — ELECTRODE ES WIDE FRONTLOADING SURF LOOP SUPERSECT

## (undated) DEVICE — STERILE POLYISOPRENE POWDER-FREE SURGICAL GLOVES: Brand: PROTEXIS

## (undated) DEVICE — SNAP KOVER: Brand: UNBRANDED

## (undated) DEVICE — OPEN-END FLEXI-TIP URETERAL CATHETER: Brand: FLEXI-TIP

## (undated) DEVICE — BAG DRNGE NONSTERILE W/ SUCT HOSE CYSTO/UROLOGICAL FOR GE

## (undated) DEVICE — SPONGE GZ W4XL4IN COT 12 PLY TYP VII WVN C FLD DSGN

## (undated) DEVICE — SOLUTION IRRIG 3000ML 0.9% SOD CHL FLX CONT 0797208] ICU MEDICAL INC]

## (undated) DEVICE — CATHETER F BLLN 5CC 18FR 2 W HYDRGEL COAT LESS TRAUM LUB

## (undated) DEVICE — SYRINGE MED 20ML STD CLR PLAS LUERLOCK TIP N CTRL DISP

## (undated) DEVICE — PLASMAKINETIC SUPERPLUSE SYSTEM PK BUTTON FRONT LOADING ELECTRODE: Brand: PLASMAKINETIC

## (undated) DEVICE — BAG DRAIN URIN 2000ML LF STRL -- CONVERT TO ITEM 363123

## (undated) DEVICE — TRAY PREP DRY W/ PREM GLV 2 APPL 6 SPNG 2 UNDPD 1 OVERWRAP

## (undated) DEVICE — Device